# Patient Record
Sex: MALE | Race: WHITE | Employment: OTHER | URBAN - METROPOLITAN AREA
[De-identification: names, ages, dates, MRNs, and addresses within clinical notes are randomized per-mention and may not be internally consistent; named-entity substitution may affect disease eponyms.]

---

## 2021-11-02 ENCOUNTER — HOSPITAL ENCOUNTER (EMERGENCY)
Age: 61
Discharge: HOME | End: 2021-11-02
Attending: EMERGENCY MEDICINE
Payer: MEDICARE

## 2021-11-02 VITALS
OXYGEN SATURATION: 96 % | HEIGHT: 71 IN | HEART RATE: 79 BPM | BODY MASS INDEX: 31.22 KG/M2 | TEMPERATURE: 98.2 F | RESPIRATION RATE: 20 BRPM | DIASTOLIC BLOOD PRESSURE: 83 MMHG | SYSTOLIC BLOOD PRESSURE: 125 MMHG | WEIGHT: 223 LBS

## 2021-11-02 DIAGNOSIS — Z01.89 LABORATORY TEST: Primary | ICD-10-CM

## 2021-11-02 LAB
ALBUMIN SERPL-MCNC: 3.8 G/DL (ref 3.5–4.6)
ALP BLD-CCNC: 107 U/L (ref 35–104)
ALT SERPL-CCNC: 34 U/L (ref 0–41)
ANION GAP SERPL CALCULATED.3IONS-SCNC: 11 MEQ/L (ref 9–15)
AST SERPL-CCNC: 37 U/L (ref 0–40)
BASOPHILS ABSOLUTE: 0.1 K/UL (ref 0–0.2)
BASOPHILS RELATIVE PERCENT: 0.9 %
BILIRUB SERPL-MCNC: 0.3 MG/DL (ref 0.2–0.7)
BUN BLDV-MCNC: 16 MG/DL (ref 8–23)
CALCIUM SERPL-MCNC: 8.7 MG/DL (ref 8.5–9.9)
CHLORIDE BLD-SCNC: 101 MEQ/L (ref 95–107)
CO2: 26 MEQ/L (ref 20–31)
CREAT SERPL-MCNC: 0.92 MG/DL (ref 0.7–1.2)
EOSINOPHILS ABSOLUTE: 0.2 K/UL (ref 0–0.7)
EOSINOPHILS RELATIVE PERCENT: 3 %
GFR AFRICAN AMERICAN: >60
GFR NON-AFRICAN AMERICAN: >60
GLOBULIN: 3.4 G/DL (ref 2.3–3.5)
GLUCOSE BLD-MCNC: 209 MG/DL (ref 70–99)
HCT VFR BLD CALC: 44.9 % (ref 42–52)
HEMOGLOBIN: 14.9 G/DL (ref 14–18)
LYMPHOCYTES ABSOLUTE: 1.7 K/UL (ref 1–4.8)
LYMPHOCYTES RELATIVE PERCENT: 21.8 %
MAGNESIUM: 2.3 MG/DL (ref 1.7–2.4)
MCH RBC QN AUTO: 32 PG (ref 27–31.3)
MCHC RBC AUTO-ENTMCNC: 33.1 % (ref 33–37)
MCV RBC AUTO: 96.8 FL (ref 80–100)
MONOCYTES ABSOLUTE: 0.4 K/UL (ref 0.2–0.8)
MONOCYTES RELATIVE PERCENT: 5.5 %
NEUTROPHILS ABSOLUTE: 5.4 K/UL (ref 1.4–6.5)
NEUTROPHILS RELATIVE PERCENT: 68.8 %
PDW BLD-RTO: 13 % (ref 11.5–14.5)
PLATELET # BLD: 70 K/UL (ref 130–400)
PLATELET SLIDE REVIEW: ABNORMAL
POTASSIUM SERPL-SCNC: 4.3 MEQ/L (ref 3.4–4.9)
RBC # BLD: 4.64 M/UL (ref 4.7–6.1)
SODIUM BLD-SCNC: 138 MEQ/L (ref 135–144)
TOTAL PROTEIN: 7.2 G/DL (ref 6.3–8)
WBC # BLD: 7.8 K/UL (ref 4.8–10.8)

## 2021-11-02 PROCEDURE — 36415 COLL VENOUS BLD VENIPUNCTURE: CPT

## 2021-11-02 PROCEDURE — 93005 ELECTROCARDIOGRAM TRACING: CPT | Performed by: EMERGENCY MEDICINE

## 2021-11-02 PROCEDURE — 99282 EMERGENCY DEPT VISIT SF MDM: CPT

## 2021-11-02 PROCEDURE — 85025 COMPLETE CBC W/AUTO DIFF WBC: CPT

## 2021-11-02 PROCEDURE — 83735 ASSAY OF MAGNESIUM: CPT

## 2021-11-02 PROCEDURE — 80053 COMPREHEN METABOLIC PANEL: CPT

## 2021-11-02 ASSESSMENT — ENCOUNTER SYMPTOMS
ABDOMINAL PAIN: 0
EYE PAIN: 0
NAUSEA: 0
VOMITING: 0
SORE THROAT: 0
SHORTNESS OF BREATH: 0
CHEST TIGHTNESS: 0

## 2021-11-02 ASSESSMENT — PAIN DESCRIPTION - PAIN TYPE: TYPE: CHRONIC PAIN

## 2021-11-02 ASSESSMENT — PAIN DESCRIPTION - LOCATION: LOCATION: BACK

## 2021-11-02 NOTE — ED PROVIDER NOTES
[address] [facility] ED  EMERGENCY DEPARTMENT ENCOUNTER      Pt Name: Alize Michaels  MRN: [id number]  [place] 1960  Date of evaluation: 11/2/2021  Provider: Devin Byrne DO    CHIEF COMPLAINT       Chief Complaint   Patient presents with    Other     hyperkalemia, draw today to [facility], called and told to come to ed for high K, taking KCl, last dose today         HISTORY OF PRESENT ILLNESS   (Location/Symptom, Timing/Onset, Context/Setting, Quality, Duration, Modifying Factors, Severity)  Note limiting factors. Alize Michaels is a 56 y.o. male who presents to the emergency department . Patient called for abnormal lab. Was told that his potassium was high on his lab draw yesterday. Patient has been taking potassium for a long time. The dose has not changed. He always has issues with low potassium not high potassium. The labs were only done yesterday as a routine and he did not have symptoms. Patient has history of atrial fibrillation and diabetes and tells me that he was just recently diagnosed with pancreatic cancer. HPI    Nursing Notes were reviewed. REVIEW OF SYSTEMS    (2-9 systems for level 4, 10 or more for level 5)     Review of Systems   Constitutional: Negative for activity change, appetite change and fatigue. HENT: Negative for congestion and sore throat. Eyes: Negative for pain and visual disturbance. Respiratory: Negative for chest tightness and shortness of breath. Cardiovascular: Negative for chest pain. Gastrointestinal: Negative for abdominal pain, nausea and vomiting. Endocrine: Negative for polydipsia. Genitourinary: Negative for flank pain and urgency. Musculoskeletal: Negative for gait problem and neck stiffness. Skin: Negative for rash. Neurological: Negative for weakness, light-headedness and headaches. Psychiatric/Behavioral: Negative for confusion and sleep disturbance.        Except as noted above the remainder of the review of systems was reviewed and negative. PAST MEDICAL HISTORY     Past Medical History:   Diagnosis Date    Atrial fibrillation ([facility] [address]     Diabetes mellitus ([facility]          SURGICAL HISTORY       Past Surgical History:   Procedure Laterality Date    APPENDECTOMY      HERNIA REPAIR           CURRENT MEDICATIONS       Previous Medications    No medications on file       ALLERGIES     Acetaminophen and Sulfa antibiotics    FAMILY HISTORY     History reviewed. No pertinent family history. SOCIAL HISTORY       Social History     Socioeconomic History    Marital status: Single     Spouse name: None    Number of children: None    Years of education: None    Highest education level: None   Occupational History    None   Tobacco Use    Smoking status: Current Every Day Smoker     Packs/day: 2.00     Types: Cigars    Smokeless tobacco: Never Used   Substance and Sexual Activity    Alcohol use: None    Drug use: None    Sexual activity: None   Other Topics Concern    None   Social History Narrative    None     Social Determinants of Health     Financial Resource Strain:     Difficulty of Paying Living Expenses:    Food Insecurity:     Worried About Running Out of Food in the Last Year:     Ran Out of Food in the Last Year:    Transportation Needs:     Lack of Transportation (Medical):      Lack of Transportation (Non-Medical):    Physical Activity:     Days of Exercise per Week:     Minutes of Exercise per Session:    Stress:     Feeling of Stress :    Social Connections:     Frequency of Communication with Friends and Family:     Frequency of Social Gatherings with Friends and Family:     Attends Hoahaoism Services:     Active Member of Clubs or Organizations:     Attends Club or Organization Meetings:     Marital Status:    Intimate Partner Violence:     Fear of Current or Ex-Partner:     Emotionally Abused:     Physically Abused:     Sexually Abused:        SCREENINGS                        PHYSICAL EXAM    (up to 7 for level 4, 8 or more for level 5)     ED Triage Vitals [11/02/21 1546]   BP Temp Temp Source Pulse Resp SpO2 Height Weight   125/83 98.2 °F (36.8 °C) Oral 79 20 96 % 5' 11\" (1.803 m) 223 lb (101.2 kg)       Physical Exam  Vitals and nursing note reviewed. Constitutional:       General: He is not in acute distress. Appearance: He is well-developed. He is not diaphoretic. HENT:      Head: Normocephalic and atraumatic. Right Ear: External ear normal.      Left Ear: External ear normal.      Mouth/Throat:      Pharynx: No oropharyngeal exudate. Eyes:      Conjunctiva/sclera: Conjunctivae normal.      Pupils: Pupils are equal, round, and reactive to light. Neck:      Thyroid: No thyromegaly. Vascular: No JVD. Trachea: No tracheal deviation. Cardiovascular:      Rate and Rhythm: Normal rate. Heart sounds: Normal heart sounds. No murmur heard. Pulmonary:      Effort: Pulmonary effort is normal. No respiratory distress. Breath sounds: Normal breath sounds. No wheezing. Abdominal:      General: Bowel sounds are normal.      Palpations: Abdomen is soft. Tenderness: There is no abdominal tenderness. There is no guarding. Musculoskeletal:         General: Normal range of motion. Cervical back: Normal range of motion and neck supple. Skin:     General: Skin is warm and dry. Findings: No rash. Neurological:      Mental Status: He is alert and oriented to person, place, and time. Cranial Nerves: No cranial nerve deficit.    Psychiatric:         Behavior: Behavior normal.         DIAGNOSTIC RESULTS     EKG: All EKG's are interpreted by the Emergency Department Physician who either signs or Co-signs this chart in the absence of a cardiologist.    Atrial flutter 74 bpm right bundle branch block    RADIOLOGY:   Non-plain film images such as CT, Ultrasound and MRI are read by the radiologist. Plain radiographic images are visualized and preliminarily interpreted by the emergency physician with the below findings:        Interpretation per the Radiologist below, if available at the time of this note:    No orders to display         ED BEDSIDE ULTRASOUND:   Performed by ED Physician - none    LABS:  Labs Reviewed   CBC WITH AUTO DIFFERENTIAL - Abnormal; Notable for the following components:       Result Value    RBC 4.64 (*)     MCH 32.0 (*)     All other components within normal limits   COMPREHENSIVE METABOLIC PANEL   MAGNESIUM       All other labs were within normal range or not returned as of this dictation. EMERGENCY DEPARTMENT COURSE and DIFFERENTIAL DIAGNOSIS/MDM:   Vitals:    Vitals:    11/02/21 1546   BP: 125/83   Pulse: 79   Resp: 20   Temp: 98.2 °F (36.8 °C)   TempSrc: Oral   SpO2: 96%   Weight: 223 lb (101.2 kg)   Height: 5' 11\" (1.803 m)       Patient came in for erroneously high potassium level. Most likely the lab was hemolyzed yesterday. Today it is 4.3. He can continue his usual potassium supplementation and follow-up at the [facility] time was 0 minutes, excluding separately reportable procedures. There was a high probability of clinically significant/life threatening deterioration in the patient's condition which required my urgent intervention. CONSULTS:  None    PROCEDURES:  Unless otherwise noted below, none     Procedures        FINAL IMPRESSION      1. Laboratory test          DISPOSITION/PLAN   DISPOSITION        PATIENT REFERRED TO:  Nu Fay MD  [facility] [address]  [phone]      As needed      DISCHARGE MEDICATIONS:  New Prescriptions    No medications on file     Controlled Substances Monitoring:     No flowsheet data found.     (Please note that portions of this note were completed with a voice recognition program.  Efforts were made to edit the dictations but occasionally words are mis-transcribed.)    Hilaria Sherman  (electronically signed)  Attending Emergency Physician            Jayme Metz DO  11/02/21 5336

## 2021-11-03 LAB
EKG ATRIAL RATE: 208 BPM
EKG P AXIS: 32 DEGREES
EKG Q-T INTERVAL: 312 MS
EKG QRS DURATION: 128 MS
EKG QTC CALCULATION (BAZETT): 346 MS
EKG R AXIS: -28 DEGREES
EKG T AXIS: 1 DEGREES
EKG VENTRICULAR RATE: 74 BPM

## 2021-11-03 PROCEDURE — 93010 ELECTROCARDIOGRAM REPORT: CPT | Performed by: INTERNAL MEDICINE

## 2022-01-01 ENCOUNTER — HOSPITAL ENCOUNTER (EMERGENCY)
Age: 62
Discharge: HOME | End: 2022-01-01
Payer: MEDICARE

## 2022-01-01 ENCOUNTER — APPOINTMENT (OUTPATIENT)
Dept: GENERAL RADIOLOGY | Age: 62
End: 2022-01-01

## 2022-01-01 VITALS
TEMPERATURE: 98.2 F | WEIGHT: 230 LBS | SYSTOLIC BLOOD PRESSURE: 110 MMHG | OXYGEN SATURATION: 96 % | HEIGHT: 71 IN | DIASTOLIC BLOOD PRESSURE: 65 MMHG | BODY MASS INDEX: 32.2 KG/M2 | RESPIRATION RATE: 16 BRPM | HEART RATE: 84 BPM

## 2022-01-01 DIAGNOSIS — R73.9 HYPERGLYCEMIA: ICD-10-CM

## 2022-01-01 DIAGNOSIS — R10.13 EPIGASTRIC PAIN: ICD-10-CM

## 2022-01-01 DIAGNOSIS — C25.9 MALIGNANT NEOPLASM OF PANCREAS, UNSPECIFIED LOCATION OF MALIGNANCY (HCC): ICD-10-CM

## 2022-01-01 DIAGNOSIS — R53.83 FATIGUE, UNSPECIFIED TYPE: Primary | ICD-10-CM

## 2022-01-01 DIAGNOSIS — R60.9 PERIPHERAL EDEMA: ICD-10-CM

## 2022-01-01 DIAGNOSIS — R79.89 ELEVATED LFTS: ICD-10-CM

## 2022-01-01 LAB
ALBUMIN SERPL-MCNC: 2.5 G/DL (ref 3.5–4.6)
ALP BLD-CCNC: 162 U/L (ref 35–104)
ALT SERPL-CCNC: 96 U/L (ref 0–41)
ANION GAP SERPL CALCULATED.3IONS-SCNC: 15 MEQ/L (ref 9–15)
ANISOCYTOSIS: ABNORMAL
AST SERPL-CCNC: 72 U/L (ref 0–40)
ATYPICAL LYMPHOCYTE RELATIVE PERCENT: 1 %
BACTERIA: NEGATIVE /HPF
BANDED NEUTROPHILS RELATIVE PERCENT: 1 %
BASOPHILS ABSOLUTE: 0 K/UL (ref 0–0.2)
BASOPHILS RELATIVE PERCENT: 0.4 %
BILIRUB SERPL-MCNC: 0.8 MG/DL (ref 0.2–0.7)
BILIRUBIN URINE: NEGATIVE
BLOOD, URINE: NEGATIVE
BUN BLDV-MCNC: 21 MG/DL (ref 8–23)
CALCIUM SERPL-MCNC: 6.7 MG/DL (ref 8.5–9.9)
CHLORIDE BLD-SCNC: 99 MEQ/L (ref 95–107)
CLARITY: CLEAR
CO2: 17 MEQ/L (ref 20–31)
COLOR: YELLOW
CREAT SERPL-MCNC: 0.78 MG/DL (ref 0.7–1.2)
EOSINOPHILS ABSOLUTE: 0.2 K/UL (ref 0–0.7)
EOSINOPHILS RELATIVE PERCENT: 2 %
EPITHELIAL CELLS, UA: NORMAL /HPF (ref 0–5)
GFR AFRICAN AMERICAN: >60
GFR NON-AFRICAN AMERICAN: >60
GLOBULIN: 2.6 G/DL (ref 2.3–3.5)
GLUCOSE BLD-MCNC: 173 MG/DL (ref 70–99)
GLUCOSE URINE: >=1000 MG/DL
HCT VFR BLD CALC: 26.6 % (ref 42–52)
HEMOGLOBIN: 8.5 G/DL (ref 14–18)
HYALINE CASTS: NORMAL /HPF (ref 0–5)
HYPOCHROMIA: ABNORMAL
KETONES, URINE: ABNORMAL MG/DL
LEUKOCYTE ESTERASE, URINE: NEGATIVE
LIPASE: 47 U/L (ref 12–95)
LYMPHOCYTES ABSOLUTE: 0.7 K/UL (ref 1–4.8)
LYMPHOCYTES RELATIVE PERCENT: 8 %
MAGNESIUM: 2.1 MG/DL (ref 1.7–2.4)
MCH RBC QN AUTO: 32 PG (ref 27–31.3)
MCHC RBC AUTO-ENTMCNC: 31.8 % (ref 33–37)
MCV RBC AUTO: 100.6 FL (ref 80–100)
METAMYELOCYTES RELATIVE PERCENT: 1 %
MONOCYTES ABSOLUTE: 0.5 K/UL (ref 0.2–0.8)
MONOCYTES RELATIVE PERCENT: 5.8 %
MYELOCYTE PERCENT: 1 %
NEUTROPHILS ABSOLUTE: 6.4 K/UL (ref 1.4–6.5)
NEUTROPHILS RELATIVE PERCENT: 81 %
NITRITE, URINE: POSITIVE
NUCLEATED RED BLOOD CELLS: 14 /100 WBC
PDW BLD-RTO: 19.3 % (ref 11.5–14.5)
PH UA: 5.5 (ref 5–9)
PLATELET # BLD: 72 K/UL (ref 130–400)
PLATELET SLIDE REVIEW: ABNORMAL
POIKILOCYTES: ABNORMAL
POLYCHROMASIA: ABNORMAL
POTASSIUM SERPL-SCNC: 4.8 MEQ/L (ref 3.4–4.9)
PRO-BNP: 151 PG/ML
PROTEIN UA: NEGATIVE MG/DL
RBC # BLD: 2.64 M/UL (ref 4.7–6.1)
RBC UA: NORMAL /HPF (ref 0–5)
SODIUM BLD-SCNC: 131 MEQ/L (ref 135–144)
SPECIFIC GRAVITY UA: 1.01 (ref 1–1.03)
TOTAL CK: 96 U/L (ref 0–190)
TOTAL PROTEIN: 5.1 G/DL (ref 6.3–8)
TSH SERPL DL<=0.05 MIU/L-ACNC: 3.2 UIU/ML (ref 0.44–3.86)
URINE REFLEX TO CULTURE: ABNORMAL
UROBILINOGEN, URINE: 0.2 E.U./DL
WBC # BLD: 7.6 K/UL (ref 4.8–10.8)
WBC UA: NORMAL /HPF (ref 0–5)

## 2022-01-01 PROCEDURE — 80053 COMPREHEN METABOLIC PANEL: CPT

## 2022-01-01 PROCEDURE — 81001 URINALYSIS AUTO W/SCOPE: CPT

## 2022-01-01 PROCEDURE — 6370000000 HC RX 637 (ALT 250 FOR IP): Performed by: PHYSICIAN ASSISTANT

## 2022-01-01 PROCEDURE — 83735 ASSAY OF MAGNESIUM: CPT

## 2022-01-01 PROCEDURE — 82550 ASSAY OF CK (CPK): CPT

## 2022-01-01 PROCEDURE — 93005 ELECTROCARDIOGRAM TRACING: CPT | Performed by: PHYSICIAN ASSISTANT

## 2022-01-01 PROCEDURE — 84443 ASSAY THYROID STIM HORMONE: CPT

## 2022-01-01 PROCEDURE — 96374 THER/PROPH/DIAG INJ IV PUSH: CPT

## 2022-01-01 PROCEDURE — 83690 ASSAY OF LIPASE: CPT

## 2022-01-01 PROCEDURE — 6360000002 HC RX W HCPCS: Performed by: PHYSICIAN ASSISTANT

## 2022-01-01 PROCEDURE — 99285 EMERGENCY DEPT VISIT HI MDM: CPT

## 2022-01-01 PROCEDURE — 83880 ASSAY OF NATRIURETIC PEPTIDE: CPT

## 2022-01-01 PROCEDURE — 85025 COMPLETE CBC W/AUTO DIFF WBC: CPT

## 2022-01-01 PROCEDURE — 36415 COLL VENOUS BLD VENIPUNCTURE: CPT

## 2022-01-01 PROCEDURE — 71045 X-RAY EXAM CHEST 1 VIEW: CPT

## 2022-01-01 RX ORDER — MEGESTROL ACETATE 20 MG/1
20 TABLET ORAL DAILY
Status: DISCONTINUED | OUTPATIENT
Start: 2022-01-01 | End: 2022-01-01

## 2022-01-01 RX ORDER — FUROSEMIDE 20 MG/1
20 TABLET ORAL DAILY
Qty: 3 TABLET | Refills: 0 | Status: SHIPPED | OUTPATIENT
Start: 2022-01-01

## 2022-01-01 RX ORDER — CALCIUM CARBONATE 200(500)MG
1000 TABLET,CHEWABLE ORAL ONCE
Status: COMPLETED | OUTPATIENT
Start: 2022-01-01 | End: 2022-01-01

## 2022-01-01 RX ORDER — FUROSEMIDE 10 MG/ML
20 INJECTION INTRAMUSCULAR; INTRAVENOUS ONCE
Status: COMPLETED | OUTPATIENT
Start: 2022-01-01 | End: 2022-01-01

## 2022-01-01 RX ADMIN — FUROSEMIDE 20 MG: 10 INJECTION, SOLUTION INTRAVENOUS at 17:53

## 2022-01-01 RX ADMIN — ANTACID TABLETS 1000 MG: 500 TABLET, CHEWABLE ORAL at 17:53

## 2022-01-01 ASSESSMENT — ENCOUNTER SYMPTOMS
ANAL BLEEDING: 0
ABDOMINAL PAIN: 1
COUGH: 0
ABDOMINAL DISTENTION: 0
EYE DISCHARGE: 0
VOMITING: 0
SHORTNESS OF BREATH: 0
NAUSEA: 0
VOICE CHANGE: 0
APNEA: 0

## 2022-01-01 NOTE — ED TRIAGE NOTES
Pt presents via [facility] C/O general weakness. Pt reports he has been feeling weak over the last few weeks and has gotten progressively worse today. Pt is currently being treated for pancreatic cancer with chemo. Pt A&Ox4, ABCs intact, skin, normal, pink, warm, and dry.

## 2022-01-01 NOTE — ED PROVIDER NOTES
[address] [facility] ED  eMERGENCY dEPARTMENT eNCOUnter      Pt Name: Micki Fermin  MRN: [id number]  [place] 1960  Date of evaluation: 1/1/2022  Provider: Michael Klein PA-C    CHIEF COMPLAINT       Chief Complaint   Patient presents with    Fatigue         HISTORY OF PRESENT ILLNESS   (Location/Symptom, Timing/Onset,Context/Setting, Quality, Duration, Modifying Factors, Severity)  Note limiting factors. Micki Fermin is a 59 y.o. male who presents to the emergency department patient fatigue week difficulty ambulating leg swelling x2weeks progressively worse he is being treated for pancreatic cancer chemotherapy denies fever chills nausea vomiting denies rectal bleeding urinary bleeding he does have a wound on his right ankle. Is moderate severity nothing improves symptoms nothing worsens and    HPI    NursingNotes were reviewed. REVIEW OF SYSTEMS    (2-9 systems for level 4, 10 or more for level 5)     Review of Systems   Constitutional: Positive for fatigue. Negative for activity change, appetite change and unexpected weight change. HENT: Negative for ear discharge, nosebleeds and voice change. Eyes: Negative for discharge. Respiratory: Negative for apnea, cough and shortness of breath. Cardiovascular: Positive for leg swelling. Negative for chest pain. Gastrointestinal: Positive for abdominal pain. Negative for abdominal distention, anal bleeding, nausea and vomiting. Genitourinary: Negative for dysuria and hematuria. Musculoskeletal: Negative for joint swelling. Skin: Positive for wound. Negative for pallor. Neurological: Positive for weakness. All other systems reviewed and are negative. Except as noted above the remainder of the review of systems was reviewed and negative.        PAST MEDICAL HISTORY     Past Medical History:   Diagnosis Date    At[facility] fibrillation ([facility] [address]     Diabetes mellitus ([facility] [address]          SURGICALHISTORY       Past Surgical History: Procedure Laterality Date    APPENDECTOMY      HERNIA REPAIR           CURRENT MEDICATIONS       Previous Medications    No medications on file            Acetaminophen and Sulfa antibiotics    FAMILY HISTORY     History reviewed. No pertinent family history. SOCIAL HISTORY       Social History     Socioeconomic History    Marital status: Single     Spouse name: None    Number of children: None    Years of education: None    Highest education level: None   Occupational History    None   Tobacco Use    Smoking status: Current Every Day Smoker     Packs/day: 2.00     Types: Cigars    Smokeless tobacco: Never Used   Substance and Sexual Activity    Alcohol use: Not Currently    Drug use: Never    Sexual activity: None   Other Topics Concern    None   Social History Narrative    None     Social Determinants of Health     Financial Resource Strain:     Difficulty of Paying Living Expenses: Not on file   Food Insecurity:     Worried About Running Out of Food in the Last Year: Not on file    Ihsan of Food in the Last Year: Not on file   Transportation Needs:     Lack of Transportation (Medical): Not on file    Lack of Transportation (Non-Medical):  Not on file   Physical Activity:     Days of Exercise per Week: Not on file    Minutes of Exercise per Session: Not on file   Stress:     Feeling of Stress : Not on file   Social Connections:     Frequency of Communication with Friends and Family: Not on file    Frequency of Social Gatherings with Friends and Family: Not on file    Attends Jew Services: Not on file    Active Member of Clubs or Organizations: Not on file    Attends Club or Organization Meetings: Not on file    Marital Status: Not on file   Intimate Partner Violence:     Fear of Current or Ex-Partner: Not on file    Emotionally Abused: Not on file    Physically Abused: Not on file    Sexually Abused: Not on file   Housing Stability:     Unable to Pay for Housing in the Last Year: Not on file    Number of Places Lived in the Last Year: Not on file    Unstable Housing in the Last Year: Not on file       SCREENINGS   Ebola Virus Disease (EVD) Screening   Temp: 98.2 °F (36.8 °C)  CIWA Assessment  BP: 110/65  Pulse: 84    PHYSICAL EXAM    (up to 7 for level 4, 8 or more for level 5)     ED Triage Vitals [01/01/22 1546]   BP Temp Temp Source Pulse Resp SpO2 Height Weight   108/65 98.2 °F (36.8 °C) Oral 102 20 95 % 5' 11\" (1.803 m) 230 lb (104.3 kg)       Physical Exam  Vitals and nursing note reviewed. Constitutional:       General: He is not in acute distress. Appearance: He is well-developed. HENT:      Head: Normocephalic and atraumatic. Right Ear: External ear normal.      Left Ear: External ear normal.      Nose: Nose normal.      Mouth/Throat:      Pharynx: Oropharynx is clear. Eyes:      General:         Right eye: No discharge. Left eye: No discharge. Pupils: Pupils are equal, round, and reactive to light. Cardiovascular:      Rate and Rhythm: Normal rate and regular rhythm. Heart sounds: Normal heart sounds. Pulmonary:      Effort: Pulmonary effort is normal. No respiratory distress. Breath sounds: Normal breath sounds. No stridor. Abdominal:      General: Bowel sounds are normal. There is no distension. Palpations: Abdomen is soft. Tenderness: There is abdominal tenderness. Musculoskeletal:         General: Normal range of motion. Cervical back: Normal range of motion and neck supple. Right lower leg: Edema present. Left lower leg: Edema present. Skin:     General: Skin is warm. Findings: No erythema. Neurological:      Mental Status: He is alert and oriented to person, place, and time.    Psychiatric:         Mood and Affect: Mood normal.         RESULTS     EKG: All EKG's are interpreted by the Emergency Department Physician who either signs or Co-signsthis chart in the absence of a cardiologist.         RADIOLOGY:   Non-plain filmimages such as CT, Ultrasound and MRI are read by the radiologist. Plain radiographic images are visualized and preliminarily interpreted by the emergency physician with the below findings:         Interpretation per the Radiologist below, if available at the time ofthis note:    XR CHEST PORTABLE   Final Result   NO ACUTE CARDIOPULMONARY DISEASE. ED BEDSIDE ULTRASOUND:   Performed by ED Physician - none    LABS:  Labs Reviewed   COMPREHENSIVE METABOLIC PANEL - Abnormal; Notable for the following components:       Result Value    Sodium 131 (*)     CO2 17 (*)     Glucose 173 (*)     Calcium 6.7 (*)     Total Protein 5.1 (*)     Albumin 2.5 (*)     Total Bilirubin 0.8 (*)     Alkaline Phosphatase 162 (*)     ALT 96 (*)     AST 72 (*)     All other components within normal limits   CBC WITH AUTO DIFFERENTIAL - Abnormal; Notable for the following components:    RBC 2.64 (*)     Hemoglobin 8.5 (*)     Hematocrit 26.6 (*)     .6 (*)     MCH 32.0 (*)     MCHC 31.8 (*)     RDW 19.3 (*)     Platelets 72 (*)     Lymphocytes Absolute 0.7 (*)     All other components within normal limits   URINE RT REFLEX TO CULTURE - Abnormal; Notable for the following components:    Glucose, Ur >=1000 (*)     Ketones, Urine TRACE (*)     Nitrite, Urine POSITIVE (*)     All other components within normal limits   LIPASE   MAGNESIUM   TSH WITHOUT REFLEX   CK   BRAIN NATRIURETIC PEPTIDE   MICROSCOPIC URINALYSIS       All other labs were within normal range or not returned as of this dictation.     EMERGENCY DEPARTMENT COURSE and DIFFERENTIAL DIAGNOSIS/MDM:   Vitals:    Vitals:    01/01/22 1800 01/01/22 1900 01/01/22 1925 01/01/22 1930   BP: 118/67 114/70  110/65   Pulse: 92 92 85 84   Resp: 18 18 16 16   Temp:       TempSrc:       SpO2: 95% 94% 95% 96%   Weight:       Height:                MDM  Number of Diagnoses or Management Options  Elevated LFTs  Epigastric pain  Fatigue, unspecified type  Hyperglycemia  Malignant neoplasm of pancreas, unspecified location of malignancy ([address]  Peripheral edema  Diagnosis management comments: Pt sts he has been on weekly chemo for three weeks and has been having worsening symptoms. He sts he is still eating and drinking adequately and is taking po intake in er. He remains conversant in er. We discussed follow up. Pt has no questions at this time. He is aware his family called and we discussed he could explain plan to them or ask additional questions       Amount and/or Complexity of Data Reviewed  Clinical lab tests: reviewed and ordered  Tests in the radiology section of CPT®: ordered and reviewed        CRITICAL CARE TIME       CONSULTS:  IP CONSULT TO SOCIAL WORK    PROCEDURES:  Unless otherwise noted below, none     Procedures    FINAL IMPRESSION      1. Fatigue, unspecified type    2. Malignant neoplasm of pancreas, unspecified location of malignancy ([address]    3. Epigastric pain    4. Elevated LFTs    5. Hyperglycemia    6.  Peripheral edema          DISPOSITION/PLAN   DISPOSITION Discharge - Pending Orders Complete 01/01/2022 08:02:45 PM      PATIENT REFERRED TO:  Cassius Barillas MD  [facility] [place] [phone]    Call in 2 days      [facility]) ED  [address] [address]  [phone]    If symptoms worsen      DISCHARGE MEDICATIONS:  New Prescriptions    FUROSEMIDE (LASIX) 20 MG TABLET    Take 1 tablet by mouth daily          (Please note that portions of this note were completed with a voice recognition program.  Efforts were made to edit the dictations but occasionally words are mis-transcribed.)    Sary Glynn PA-C (electronically signed)  Attending Emergency Physician       Sary Glynn PA-C  01/01/22 2005       Sary Glynn PA-C  01/01/22 2054       Sary Glynn PA-C  02/14/22 1919

## 2022-01-01 NOTE — ED NOTES
Bed: 08  Expected date: 1/1/22  Expected time: 3:33 PM  Means of arrival: [facility]  Comments:  59 m gen weakness hx of pancreatic ca : chemo x 3 week iv w/ labs  110/68 104 20 96ra ot 401 [facility], RN  01/01/22 2850

## 2022-01-02 NOTE — ED NOTES
Pt provided with discharge instructions and f/u care instructions. RX x1 home with pt. Medication education completed. Pt verbalizes understanding; denies questions. Pt off unit via [facility] in stable condition. Family driving pt home.      Renea Miner RN  01/01/22 0629

## 2022-01-03 LAB
EKG ATRIAL RATE: 99 BPM
EKG P AXIS: 34 DEGREES
EKG P-R INTERVAL: 176 MS
EKG Q-T INTERVAL: 380 MS
EKG QRS DURATION: 118 MS
EKG QTC CALCULATION (BAZETT): 487 MS
EKG R AXIS: -19 DEGREES
EKG T AXIS: 0 DEGREES
EKG VENTRICULAR RATE: 99 BPM

## 2022-01-03 PROCEDURE — 93010 ELECTROCARDIOGRAM REPORT: CPT | Performed by: INTERNAL MEDICINE

## 2022-02-26 ENCOUNTER — APPOINTMENT (OUTPATIENT)
Dept: GENERAL RADIOLOGY | Age: 62
DRG: 493 | End: 2022-02-26
Payer: OTHER GOVERNMENT

## 2022-02-26 ENCOUNTER — ANESTHESIA EVENT (OUTPATIENT)
Dept: OPERATING ROOM | Age: 62
DRG: 493 | End: 2022-02-26
Payer: OTHER GOVERNMENT

## 2022-02-26 ENCOUNTER — HOSPITAL ENCOUNTER (INPATIENT)
Age: 62
LOS: 3 days | Discharge: NURSING, REHAB OR LONG-TERM CARE | DRG: 493 | End: 2022-03-03
Attending: EMERGENCY MEDICINE | Admitting: ORTHOPAEDIC SURGERY
Payer: OTHER GOVERNMENT

## 2022-02-26 ENCOUNTER — ANESTHESIA (OUTPATIENT)
Dept: OPERATING ROOM | Age: 62
DRG: 493 | End: 2022-02-26
Payer: OTHER GOVERNMENT

## 2022-02-26 VITALS
DIASTOLIC BLOOD PRESSURE: 87 MMHG | TEMPERATURE: 98.2 F | SYSTOLIC BLOOD PRESSURE: 130 MMHG | RESPIRATION RATE: 10 BRPM | OXYGEN SATURATION: 100 %

## 2022-02-26 DIAGNOSIS — S82.401S CLOSED FRACTURE OF RIGHT TIBIA AND FIBULA, SEQUELA: ICD-10-CM

## 2022-02-26 DIAGNOSIS — S82.201S CLOSED FRACTURE OF RIGHT TIBIA AND FIBULA, SEQUELA: ICD-10-CM

## 2022-02-26 DIAGNOSIS — S82.202A CLOSED FRACTURE OF LEFT TIBIA AND FIBULA, INITIAL ENCOUNTER: Primary | ICD-10-CM

## 2022-02-26 DIAGNOSIS — S82.402A CLOSED FRACTURE OF LEFT TIBIA AND FIBULA, INITIAL ENCOUNTER: Primary | ICD-10-CM

## 2022-02-26 DIAGNOSIS — R52 PAIN: ICD-10-CM

## 2022-02-26 PROBLEM — S82.401A CLOSED FRACTURE OF RIGHT FIBULA AND TIBIA: Status: ACTIVE | Noted: 2022-02-26

## 2022-02-26 PROBLEM — S82.201A CLOSED FRACTURE OF RIGHT FIBULA AND TIBIA: Status: ACTIVE | Noted: 2022-02-26

## 2022-02-26 LAB
ALBUMIN SERPL-MCNC: 2.5 G/DL (ref 3.5–4.6)
ALP BLD-CCNC: 235 U/L (ref 35–104)
ALT SERPL-CCNC: 35 U/L (ref 0–41)
ANION GAP SERPL CALCULATED.3IONS-SCNC: 12 MEQ/L (ref 9–15)
ANISOCYTOSIS: ABNORMAL
AST SERPL-CCNC: 91 U/L (ref 0–40)
ATYPICAL LYMPHOCYTE RELATIVE PERCENT: 1 %
BASOPHILS ABSOLUTE: 0 K/UL (ref 0–0.2)
BASOPHILS RELATIVE PERCENT: 0.5 %
BILIRUB SERPL-MCNC: 0.5 MG/DL (ref 0.2–0.7)
BUN BLDV-MCNC: 7 MG/DL (ref 8–23)
CALCIUM SERPL-MCNC: 7.8 MG/DL (ref 8.5–9.9)
CHLORIDE BLD-SCNC: 97 MEQ/L (ref 95–107)
CO2: 24 MEQ/L (ref 20–31)
CREAT SERPL-MCNC: 0.52 MG/DL (ref 0.7–1.2)
EOSINOPHILS ABSOLUTE: 0.2 K/UL (ref 0–0.7)
EOSINOPHILS RELATIVE PERCENT: 3 %
GFR AFRICAN AMERICAN: >60
GFR NON-AFRICAN AMERICAN: >60
GLOBULIN: 4 G/DL (ref 2.3–3.5)
GLUCOSE BLD-MCNC: 130 MG/DL (ref 70–99)
GLUCOSE BLD-MCNC: 147 MG/DL (ref 70–99)
GLUCOSE BLD-MCNC: 150 MG/DL (ref 70–99)
GLUCOSE BLD-MCNC: 208 MG/DL (ref 70–99)
HCT VFR BLD CALC: 28.1 % (ref 42–52)
HEMOGLOBIN: 8.7 G/DL (ref 14–18)
HYPOCHROMIA: ABNORMAL
INR BLD: 1.3
LYMPHOCYTES ABSOLUTE: 1.1 K/UL (ref 1–4.8)
LYMPHOCYTES RELATIVE PERCENT: 19 %
MCH RBC QN AUTO: 26.3 PG (ref 27–31.3)
MCHC RBC AUTO-ENTMCNC: 31.1 % (ref 33–37)
MCV RBC AUTO: 84.5 FL (ref 80–100)
MONOCYTES ABSOLUTE: 0.3 K/UL (ref 0.2–0.8)
MONOCYTES RELATIVE PERCENT: 4.9 %
NEUTROPHILS ABSOLUTE: 3.8 K/UL (ref 1.4–6.5)
NEUTROPHILS RELATIVE PERCENT: 72 %
PDW BLD-RTO: 19.1 % (ref 11.5–14.5)
PERFORMED ON: ABNORMAL
PLATELET # BLD: 125 K/UL (ref 130–400)
PLATELET SLIDE REVIEW: ABNORMAL
POTASSIUM SERPL-SCNC: 4 MEQ/L (ref 3.4–4.9)
PROTHROMBIN TIME: 16.2 SEC (ref 12.3–14.9)
RBC # BLD: 3.33 M/UL (ref 4.7–6.1)
SARS-COV-2, NAAT: NOT DETECTED
SODIUM BLD-SCNC: 133 MEQ/L (ref 135–144)
TOTAL PROTEIN: 6.5 G/DL (ref 6.3–8)
WBC # BLD: 5.3 K/UL (ref 4.8–10.8)

## 2022-02-26 PROCEDURE — 80053 COMPREHEN METABOLIC PANEL: CPT

## 2022-02-26 PROCEDURE — G0378 HOSPITAL OBSERVATION PER HR: HCPCS

## 2022-02-26 PROCEDURE — 2720000010 HC SURG SUPPLY STERILE: Performed by: ORTHOPAEDIC SURGERY

## 2022-02-26 PROCEDURE — 94150 VITAL CAPACITY TEST: CPT

## 2022-02-26 PROCEDURE — 85610 PROTHROMBIN TIME: CPT

## 2022-02-26 PROCEDURE — 7100000000 HC PACU RECOVERY - FIRST 15 MIN: Performed by: ORTHOPAEDIC SURGERY

## 2022-02-26 PROCEDURE — 6360000002 HC RX W HCPCS: Performed by: FAMILY MEDICINE

## 2022-02-26 PROCEDURE — 71045 X-RAY EXAM CHEST 1 VIEW: CPT

## 2022-02-26 PROCEDURE — 6360000002 HC RX W HCPCS: Performed by: ORTHOPAEDIC SURGERY

## 2022-02-26 PROCEDURE — 6370000000 HC RX 637 (ALT 250 FOR IP): Performed by: ORTHOPAEDIC SURGERY

## 2022-02-26 PROCEDURE — 85025 COMPLETE CBC W/AUTO DIFF WBC: CPT

## 2022-02-26 PROCEDURE — 3600000004 HC SURGERY LEVEL 4 BASE: Performed by: ORTHOPAEDIC SURGERY

## 2022-02-26 PROCEDURE — 6360000002 HC RX W HCPCS: Performed by: STUDENT IN AN ORGANIZED HEALTH CARE EDUCATION/TRAINING PROGRAM

## 2022-02-26 PROCEDURE — 2580000003 HC RX 258: Performed by: STUDENT IN AN ORGANIZED HEALTH CARE EDUCATION/TRAINING PROGRAM

## 2022-02-26 PROCEDURE — 3700000001 HC ADD 15 MINUTES (ANESTHESIA): Performed by: ORTHOPAEDIC SURGERY

## 2022-02-26 PROCEDURE — 93005 ELECTROCARDIOGRAM TRACING: CPT | Performed by: EMERGENCY MEDICINE

## 2022-02-26 PROCEDURE — 29515 APPLICATION SHORT LEG SPLINT: CPT

## 2022-02-26 PROCEDURE — 64445 NJX AA&/STRD SCIATIC NRV IMG: CPT | Performed by: STUDENT IN AN ORGANIZED HEALTH CARE EDUCATION/TRAINING PROGRAM

## 2022-02-26 PROCEDURE — 99285 EMERGENCY DEPT VISIT HI MDM: CPT

## 2022-02-26 PROCEDURE — 6830039000 HC L3 TRAUMA ALERT

## 2022-02-26 PROCEDURE — A4217 STERILE WATER/SALINE, 500 ML: HCPCS | Performed by: ORTHOPAEDIC SURGERY

## 2022-02-26 PROCEDURE — 96374 THER/PROPH/DIAG INJ IV PUSH: CPT

## 2022-02-26 PROCEDURE — 96375 TX/PRO/DX INJ NEW DRUG ADDON: CPT

## 2022-02-26 PROCEDURE — C1713 ANCHOR/SCREW BN/BN,TIS/BN: HCPCS | Performed by: ORTHOPAEDIC SURGERY

## 2022-02-26 PROCEDURE — 36415 COLL VENOUS BLD VENIPUNCTURE: CPT

## 2022-02-26 PROCEDURE — 2500000003 HC RX 250 WO HCPCS: Performed by: STUDENT IN AN ORGANIZED HEALTH CARE EDUCATION/TRAINING PROGRAM

## 2022-02-26 PROCEDURE — 3600000014 HC SURGERY LEVEL 4 ADDTL 15MIN: Performed by: ORTHOPAEDIC SURGERY

## 2022-02-26 PROCEDURE — 0QHH04Z INSERTION OF INTERNAL FIXATION DEVICE INTO LEFT TIBIA, OPEN APPROACH: ICD-10-PCS | Performed by: ORTHOPAEDIC SURGERY

## 2022-02-26 PROCEDURE — 3209999900 FLUORO FOR SURGICAL PROCEDURES

## 2022-02-26 PROCEDURE — 64447 NJX AA&/STRD FEMORAL NRV IMG: CPT | Performed by: STUDENT IN AN ORGANIZED HEALTH CARE EDUCATION/TRAINING PROGRAM

## 2022-02-26 PROCEDURE — 3700000000 HC ANESTHESIA ATTENDED CARE: Performed by: ORTHOPAEDIC SURGERY

## 2022-02-26 PROCEDURE — 73590 X-RAY EXAM OF LOWER LEG: CPT

## 2022-02-26 PROCEDURE — 87635 SARS-COV-2 COVID-19 AMP PRB: CPT

## 2022-02-26 PROCEDURE — 6370000000 HC RX 637 (ALT 250 FOR IP): Performed by: FAMILY MEDICINE

## 2022-02-26 PROCEDURE — 2580000003 HC RX 258: Performed by: ORTHOPAEDIC SURGERY

## 2022-02-26 PROCEDURE — 2580000003 HC RX 258

## 2022-02-26 PROCEDURE — 2709999900 HC NON-CHARGEABLE SUPPLY: Performed by: ORTHOPAEDIC SURGERY

## 2022-02-26 PROCEDURE — 6360000002 HC RX W HCPCS: Performed by: EMERGENCY MEDICINE

## 2022-02-26 PROCEDURE — C1769 GUIDE WIRE: HCPCS | Performed by: ORTHOPAEDIC SURGERY

## 2022-02-26 PROCEDURE — 7100000001 HC PACU RECOVERY - ADDTL 15 MIN: Performed by: ORTHOPAEDIC SURGERY

## 2022-02-26 DEVICE — SCREW BNE L60MM DIA5MM ST TIB LT GRN TI ST CANN LOK FULL: Type: IMPLANTABLE DEVICE | Site: TIBIA | Status: FUNCTIONAL

## 2022-02-26 DEVICE — IMPLANTABLE DEVICE: Type: IMPLANTABLE DEVICE | Site: TIBIA | Status: FUNCTIONAL

## 2022-02-26 DEVICE — SCREW BNE L36MM DIA5MM TIB LT GRN TI ST CANN LOK FULL THRD: Type: IMPLANTABLE DEVICE | Site: TIBIA | Status: FUNCTIONAL

## 2022-02-26 DEVICE — SCREW BNE L42MM DIA5MM TIB LT GRN TI ST CANN LOK FULL THRD: Type: IMPLANTABLE DEVICE | Site: TIBIA | Status: FUNCTIONAL

## 2022-02-26 RX ORDER — OXYCODONE HYDROCHLORIDE 5 MG/1
5 TABLET ORAL EVERY 4 HOURS PRN
Status: DISCONTINUED | OUTPATIENT
Start: 2022-02-26 | End: 2022-03-04 | Stop reason: HOSPADM

## 2022-02-26 RX ORDER — SODIUM CHLORIDE 0.9 % (FLUSH) 0.9 %
5-40 SYRINGE (ML) INJECTION PRN
Status: DISCONTINUED | OUTPATIENT
Start: 2022-02-26 | End: 2022-02-26 | Stop reason: HOSPADM

## 2022-02-26 RX ORDER — MEPERIDINE HYDROCHLORIDE 25 MG/ML
12.5 INJECTION INTRAMUSCULAR; INTRAVENOUS; SUBCUTANEOUS EVERY 5 MIN PRN
Status: DISCONTINUED | OUTPATIENT
Start: 2022-02-26 | End: 2022-02-26 | Stop reason: HOSPADM

## 2022-02-26 RX ORDER — PROPOFOL 10 MG/ML
INJECTION, EMULSION INTRAVENOUS PRN
Status: DISCONTINUED | OUTPATIENT
Start: 2022-02-26 | End: 2022-02-26 | Stop reason: SDUPTHER

## 2022-02-26 RX ORDER — SODIUM CHLORIDE 0.9 % (FLUSH) 0.9 %
10 SYRINGE (ML) INJECTION EVERY 12 HOURS SCHEDULED
Status: DISCONTINUED | OUTPATIENT
Start: 2022-02-26 | End: 2022-03-04 | Stop reason: HOSPADM

## 2022-02-26 RX ORDER — POLYETHYLENE GLYCOL 3350 17 G/17G
17 POWDER, FOR SOLUTION ORAL DAILY PRN
Status: DISCONTINUED | OUTPATIENT
Start: 2022-02-26 | End: 2022-03-04 | Stop reason: HOSPADM

## 2022-02-26 RX ORDER — MIDAZOLAM HYDROCHLORIDE 1 MG/ML
INJECTION INTRAMUSCULAR; INTRAVENOUS PRN
Status: DISCONTINUED | OUTPATIENT
Start: 2022-02-26 | End: 2022-02-26 | Stop reason: SDUPTHER

## 2022-02-26 RX ORDER — SUCCINYLCHOLINE/SOD CL,ISO/PF 100 MG/5ML
SYRINGE (ML) INTRAVENOUS PRN
Status: DISCONTINUED | OUTPATIENT
Start: 2022-02-26 | End: 2022-02-26 | Stop reason: SDUPTHER

## 2022-02-26 RX ORDER — FENTANYL CITRATE 50 UG/ML
50 INJECTION, SOLUTION INTRAMUSCULAR; INTRAVENOUS EVERY 10 MIN PRN
Status: DISCONTINUED | OUTPATIENT
Start: 2022-02-26 | End: 2022-02-26 | Stop reason: HOSPADM

## 2022-02-26 RX ORDER — NICOTINE POLACRILEX 4 MG
15 LOZENGE BUCCAL PRN
Status: DISCONTINUED | OUTPATIENT
Start: 2022-02-26 | End: 2022-03-04 | Stop reason: HOSPADM

## 2022-02-26 RX ORDER — ROPIVACAINE HYDROCHLORIDE 5 MG/ML
INJECTION, SOLUTION EPIDURAL; INFILTRATION; PERINEURAL
Status: COMPLETED | OUTPATIENT
Start: 2022-02-26 | End: 2022-02-26

## 2022-02-26 RX ORDER — MAGNESIUM OXIDE 420 MG
TABLET ORAL
COMMUNITY
Start: 2021-07-19

## 2022-02-26 RX ORDER — SODIUM CHLORIDE, SODIUM LACTATE, POTASSIUM CHLORIDE, CALCIUM CHLORIDE 600; 310; 30; 20 MG/100ML; MG/100ML; MG/100ML; MG/100ML
INJECTION, SOLUTION INTRAVENOUS CONTINUOUS
Status: DISPENSED | OUTPATIENT
Start: 2022-02-26 | End: 2022-02-27

## 2022-02-26 RX ORDER — GABAPENTIN 300 MG/1
CAPSULE ORAL
COMMUNITY
Start: 2021-07-19

## 2022-02-26 RX ORDER — ALBUTEROL SULFATE 90 UG/1
AEROSOL, METERED RESPIRATORY (INHALATION)
COMMUNITY
Start: 2021-04-02

## 2022-02-26 RX ORDER — OXYCODONE HYDROCHLORIDE 5 MG/1
10 TABLET ORAL PRN
Status: DISCONTINUED | OUTPATIENT
Start: 2022-02-26 | End: 2022-02-26 | Stop reason: HOSPADM

## 2022-02-26 RX ORDER — DILTIAZEM HYDROCHLORIDE 240 MG/1
CAPSULE, EXTENDED RELEASE ORAL
COMMUNITY
Start: 2021-06-29

## 2022-02-26 RX ORDER — TRAMADOL HYDROCHLORIDE 50 MG/1
TABLET ORAL
COMMUNITY
Start: 2022-02-18

## 2022-02-26 RX ORDER — ONDANSETRON 2 MG/ML
4 INJECTION INTRAMUSCULAR; INTRAVENOUS ONCE
Status: COMPLETED | OUTPATIENT
Start: 2022-02-26 | End: 2022-02-26

## 2022-02-26 RX ORDER — SODIUM CHLORIDE 0.9 % (FLUSH) 0.9 %
5-40 SYRINGE (ML) INJECTION PRN
Status: DISCONTINUED | OUTPATIENT
Start: 2022-02-26 | End: 2022-03-04 | Stop reason: HOSPADM

## 2022-02-26 RX ORDER — VALPROIC ACID 250 MG/1
CAPSULE, LIQUID FILLED ORAL
COMMUNITY
Start: 2022-01-03

## 2022-02-26 RX ORDER — MORPHINE SULFATE 2 MG/ML
2 INJECTION, SOLUTION INTRAMUSCULAR; INTRAVENOUS
Status: DISCONTINUED | OUTPATIENT
Start: 2022-02-26 | End: 2022-03-04 | Stop reason: HOSPADM

## 2022-02-26 RX ORDER — INSULIN GLARGINE 100 [IU]/ML
10 INJECTION, SOLUTION SUBCUTANEOUS NIGHTLY
Status: DISCONTINUED | OUTPATIENT
Start: 2022-02-26 | End: 2022-03-04 | Stop reason: HOSPADM

## 2022-02-26 RX ORDER — ONDANSETRON 2 MG/ML
INJECTION INTRAMUSCULAR; INTRAVENOUS PRN
Status: DISCONTINUED | OUTPATIENT
Start: 2022-02-26 | End: 2022-02-26 | Stop reason: SDUPTHER

## 2022-02-26 RX ORDER — DEXTROSE MONOHYDRATE 25 G/50ML
12.5 INJECTION, SOLUTION INTRAVENOUS PRN
Status: DISCONTINUED | OUTPATIENT
Start: 2022-02-26 | End: 2022-03-02 | Stop reason: RX

## 2022-02-26 RX ORDER — DEXTROSE MONOHYDRATE 50 MG/ML
100 INJECTION, SOLUTION INTRAVENOUS PRN
Status: DISCONTINUED | OUTPATIENT
Start: 2022-02-26 | End: 2022-03-04 | Stop reason: HOSPADM

## 2022-02-26 RX ORDER — OXYCODONE HYDROCHLORIDE 5 MG/1
5 TABLET ORAL PRN
Status: DISCONTINUED | OUTPATIENT
Start: 2022-02-26 | End: 2022-02-26 | Stop reason: HOSPADM

## 2022-02-26 RX ORDER — DIPHENHYDRAMINE HYDROCHLORIDE 50 MG/ML
12.5 INJECTION INTRAMUSCULAR; INTRAVENOUS
Status: DISCONTINUED | OUTPATIENT
Start: 2022-02-26 | End: 2022-02-26 | Stop reason: HOSPADM

## 2022-02-26 RX ORDER — MAGNESIUM HYDROXIDE/ALUMINUM HYDROXICE/SIMETHICONE 120; 1200; 1200 MG/30ML; MG/30ML; MG/30ML
15 SUSPENSION ORAL EVERY 6 HOURS PRN
Status: DISCONTINUED | OUTPATIENT
Start: 2022-02-26 | End: 2022-03-04 | Stop reason: HOSPADM

## 2022-02-26 RX ORDER — ACETAMINOPHEN 325 MG/1
TABLET ORAL
COMMUNITY
Start: 2022-02-16

## 2022-02-26 RX ORDER — RISPERIDONE 1 MG/1
TABLET, FILM COATED ORAL
COMMUNITY
Start: 2021-11-30

## 2022-02-26 RX ORDER — MORPHINE SULFATE 4 MG/ML
4 INJECTION, SOLUTION INTRAMUSCULAR; INTRAVENOUS ONCE
Status: COMPLETED | OUTPATIENT
Start: 2022-02-26 | End: 2022-02-26

## 2022-02-26 RX ORDER — METOCLOPRAMIDE HYDROCHLORIDE 5 MG/ML
10 INJECTION INTRAMUSCULAR; INTRAVENOUS
Status: DISCONTINUED | OUTPATIENT
Start: 2022-02-26 | End: 2022-02-26 | Stop reason: HOSPADM

## 2022-02-26 RX ORDER — FENTANYL CITRATE 50 UG/ML
INJECTION, SOLUTION INTRAMUSCULAR; INTRAVENOUS PRN
Status: DISCONTINUED | OUTPATIENT
Start: 2022-02-26 | End: 2022-02-26 | Stop reason: SDUPTHER

## 2022-02-26 RX ORDER — SODIUM CHLORIDE, SODIUM LACTATE, POTASSIUM CHLORIDE, CALCIUM CHLORIDE 600; 310; 30; 20 MG/100ML; MG/100ML; MG/100ML; MG/100ML
INJECTION, SOLUTION INTRAVENOUS
Status: COMPLETED
Start: 2022-02-26 | End: 2022-02-26

## 2022-02-26 RX ORDER — MAGNESIUM HYDROXIDE 1200 MG/15ML
LIQUID ORAL CONTINUOUS PRN
Status: COMPLETED | OUTPATIENT
Start: 2022-02-26 | End: 2022-02-26

## 2022-02-26 RX ORDER — SODIUM CHLORIDE 0.9 % (FLUSH) 0.9 %
10 SYRINGE (ML) INJECTION PRN
Status: DISCONTINUED | OUTPATIENT
Start: 2022-02-26 | End: 2022-03-04 | Stop reason: HOSPADM

## 2022-02-26 RX ORDER — FAMOTIDINE 20 MG/1
20 TABLET, FILM COATED ORAL 2 TIMES DAILY
COMMUNITY

## 2022-02-26 RX ORDER — SODIUM CHLORIDE 9 MG/ML
25 INJECTION, SOLUTION INTRAVENOUS PRN
Status: DISCONTINUED | OUTPATIENT
Start: 2022-02-26 | End: 2022-03-04 | Stop reason: HOSPADM

## 2022-02-26 RX ORDER — ONDANSETRON 2 MG/ML
4 INJECTION INTRAMUSCULAR; INTRAVENOUS
Status: DISCONTINUED | OUTPATIENT
Start: 2022-02-26 | End: 2022-02-26 | Stop reason: HOSPADM

## 2022-02-26 RX ORDER — POTASSIUM CHLORIDE 750 MG/1
TABLET, FILM COATED, EXTENDED RELEASE ORAL
COMMUNITY
Start: 2022-02-18

## 2022-02-26 RX ORDER — MORPHINE SULFATE 100 MG/5ML
5 SOLUTION ORAL EVERY 4 HOURS PRN
COMMUNITY

## 2022-02-26 RX ORDER — FLUOXETINE HYDROCHLORIDE 20 MG/1
CAPSULE ORAL
COMMUNITY
Start: 2021-11-01

## 2022-02-26 RX ORDER — DOCUSATE SODIUM 100 MG/1
CAPSULE, LIQUID FILLED ORAL
COMMUNITY
Start: 2022-02-02

## 2022-02-26 RX ORDER — SODIUM CHLORIDE 0.9 % (FLUSH) 0.9 %
5-40 SYRINGE (ML) INJECTION EVERY 12 HOURS SCHEDULED
Status: DISCONTINUED | OUTPATIENT
Start: 2022-02-26 | End: 2022-02-26 | Stop reason: HOSPADM

## 2022-02-26 RX ORDER — ONDANSETRON 4 MG/1
4 TABLET, ORALLY DISINTEGRATING ORAL EVERY 8 HOURS PRN
Status: DISCONTINUED | OUTPATIENT
Start: 2022-02-26 | End: 2022-03-04 | Stop reason: HOSPADM

## 2022-02-26 RX ORDER — ONDANSETRON 2 MG/ML
4 INJECTION INTRAMUSCULAR; INTRAVENOUS EVERY 6 HOURS PRN
Status: DISCONTINUED | OUTPATIENT
Start: 2022-02-26 | End: 2022-03-04 | Stop reason: HOSPADM

## 2022-02-26 RX ORDER — ROCURONIUM BROMIDE 10 MG/ML
INJECTION, SOLUTION INTRAVENOUS PRN
Status: DISCONTINUED | OUTPATIENT
Start: 2022-02-26 | End: 2022-02-26 | Stop reason: SDUPTHER

## 2022-02-26 RX ORDER — PRAZOSIN HYDROCHLORIDE 2 MG/1
CAPSULE ORAL
COMMUNITY
Start: 2021-08-11

## 2022-02-26 RX ORDER — SODIUM CHLORIDE, SODIUM LACTATE, POTASSIUM CHLORIDE, CALCIUM CHLORIDE 600; 310; 30; 20 MG/100ML; MG/100ML; MG/100ML; MG/100ML
INJECTION, SOLUTION INTRAVENOUS CONTINUOUS PRN
Status: DISCONTINUED | OUTPATIENT
Start: 2022-02-26 | End: 2022-02-26 | Stop reason: SDUPTHER

## 2022-02-26 RX ORDER — LATANOPROST 50 UG/ML
SOLUTION/ DROPS OPHTHALMIC
COMMUNITY
Start: 2021-04-30

## 2022-02-26 RX ORDER — SODIUM CHLORIDE 0.9 % (FLUSH) 0.9 %
5-40 SYRINGE (ML) INJECTION EVERY 12 HOURS SCHEDULED
Status: DISCONTINUED | OUTPATIENT
Start: 2022-02-26 | End: 2022-03-04 | Stop reason: HOSPADM

## 2022-02-26 RX ORDER — SODIUM CHLORIDE 9 MG/ML
25 INJECTION, SOLUTION INTRAVENOUS PRN
Status: DISCONTINUED | OUTPATIENT
Start: 2022-02-26 | End: 2022-02-26 | Stop reason: HOSPADM

## 2022-02-26 RX ORDER — FINASTERIDE 5 MG/1
TABLET, FILM COATED ORAL
COMMUNITY
Start: 2021-07-19

## 2022-02-26 RX ORDER — LIDOCAINE HYDROCHLORIDE 10 MG/ML
1 INJECTION, SOLUTION EPIDURAL; INFILTRATION; INTRACAUDAL; PERINEURAL ONCE
Status: DISCONTINUED | OUTPATIENT
Start: 2022-02-26 | End: 2022-02-26 | Stop reason: HOSPADM

## 2022-02-26 RX ORDER — SODIUM CHLORIDE, SODIUM LACTATE, POTASSIUM CHLORIDE, CALCIUM CHLORIDE 600; 310; 30; 20 MG/100ML; MG/100ML; MG/100ML; MG/100ML
INJECTION, SOLUTION INTRAVENOUS CONTINUOUS
Status: DISCONTINUED | OUTPATIENT
Start: 2022-02-26 | End: 2022-02-26

## 2022-02-26 RX ADMIN — ONDANSETRON 4 MG: 2 INJECTION INTRAMUSCULAR; INTRAVENOUS at 11:41

## 2022-02-26 RX ADMIN — SODIUM CHLORIDE, POTASSIUM CHLORIDE, SODIUM LACTATE AND CALCIUM CHLORIDE: 600; 310; 30; 20 INJECTION, SOLUTION INTRAVENOUS at 13:27

## 2022-02-26 RX ADMIN — MORPHINE SULFATE 2 MG: 2 INJECTION, SOLUTION INTRAMUSCULAR; INTRAVENOUS at 21:14

## 2022-02-26 RX ADMIN — OXYCODONE HYDROCHLORIDE 5 MG: 5 TABLET ORAL at 15:53

## 2022-02-26 RX ADMIN — SODIUM CHLORIDE, POTASSIUM CHLORIDE, SODIUM LACTATE AND CALCIUM CHLORIDE: 600; 310; 30; 20 INJECTION, SOLUTION INTRAVENOUS at 10:01

## 2022-02-26 RX ADMIN — ROCURONIUM BROMIDE 50 MG: 10 INJECTION INTRAVENOUS at 10:14

## 2022-02-26 RX ADMIN — HYDROMORPHONE HYDROCHLORIDE 1 MG: 1 INJECTION, SOLUTION INTRAMUSCULAR; INTRAVENOUS; SUBCUTANEOUS at 22:29

## 2022-02-26 RX ADMIN — SODIUM CHLORIDE, POTASSIUM CHLORIDE, SODIUM LACTATE AND CALCIUM CHLORIDE: 600; 310; 30; 20 INJECTION, SOLUTION INTRAVENOUS at 12:39

## 2022-02-26 RX ADMIN — CEFAZOLIN 2000 MG: 10 INJECTION, POWDER, FOR SOLUTION INTRAVENOUS at 10:05

## 2022-02-26 RX ADMIN — HYDROMORPHONE HYDROCHLORIDE 1 MG: 1 INJECTION, SOLUTION INTRAMUSCULAR; INTRAVENOUS; SUBCUTANEOUS at 06:12

## 2022-02-26 RX ADMIN — MIDAZOLAM HYDROCHLORIDE 2 MG: 1 INJECTION, SOLUTION INTRAMUSCULAR; INTRAVENOUS at 10:05

## 2022-02-26 RX ADMIN — ROPIVACAINE HYDROCHLORIDE 15 ML: 5 INJECTION, SOLUTION EPIDURAL; INFILTRATION; PERINEURAL at 10:23

## 2022-02-26 RX ADMIN — CEFAZOLIN 2000 MG: 10 INJECTION, POWDER, FOR SOLUTION INTRAVENOUS at 15:56

## 2022-02-26 RX ADMIN — SODIUM CHLORIDE, POTASSIUM CHLORIDE, SODIUM LACTATE AND CALCIUM CHLORIDE: 600; 310; 30; 20 INJECTION, SOLUTION INTRAVENOUS at 10:04

## 2022-02-26 RX ADMIN — OXYCODONE HYDROCHLORIDE 5 MG: 5 TABLET ORAL at 23:42

## 2022-02-26 RX ADMIN — HYDROMORPHONE HYDROCHLORIDE 1 MG: 1 INJECTION, SOLUTION INTRAMUSCULAR; INTRAVENOUS; SUBCUTANEOUS at 08:16

## 2022-02-26 RX ADMIN — OXYCODONE HYDROCHLORIDE 5 MG: 5 TABLET ORAL at 20:00

## 2022-02-26 RX ADMIN — FENTANYL CITRATE 50 MCG: 50 INJECTION, SOLUTION INTRAMUSCULAR; INTRAVENOUS at 11:42

## 2022-02-26 RX ADMIN — APIXABAN 5 MG: 5 TABLET, FILM COATED ORAL at 23:42

## 2022-02-26 RX ADMIN — SUGAMMADEX 200 MG: 100 INJECTION, SOLUTION INTRAVENOUS at 11:53

## 2022-02-26 RX ADMIN — ONDANSETRON 4 MG: 2 INJECTION INTRAMUSCULAR; INTRAVENOUS at 04:56

## 2022-02-26 RX ADMIN — Medication 100 MG: at 10:10

## 2022-02-26 RX ADMIN — INSULIN GLARGINE 10 UNITS: 100 INJECTION, SOLUTION SUBCUTANEOUS at 21:07

## 2022-02-26 RX ADMIN — ROPIVACAINE HYDROCHLORIDE 20 ML: 5 INJECTION, SOLUTION EPIDURAL; INFILTRATION; PERINEURAL at 10:23

## 2022-02-26 RX ADMIN — MORPHINE SULFATE 4 MG: 4 INJECTION, SOLUTION INTRAMUSCULAR; INTRAVENOUS at 04:57

## 2022-02-26 RX ADMIN — PROPOFOL 140 MG: 10 INJECTION, EMULSION INTRAVENOUS at 10:10

## 2022-02-26 RX ADMIN — FENTANYL CITRATE 50 MCG: 50 INJECTION, SOLUTION INTRAMUSCULAR; INTRAVENOUS at 10:10

## 2022-02-26 RX ADMIN — CEFAZOLIN 2000 MG: 10 INJECTION, POWDER, FOR SOLUTION INTRAVENOUS at 21:18

## 2022-02-26 ASSESSMENT — PULMONARY FUNCTION TESTS
PIF_VALUE: 19
PIF_VALUE: 18
PIF_VALUE: 2
PIF_VALUE: 18
PIF_VALUE: 19
PIF_VALUE: 18
PIF_VALUE: 18
PIF_VALUE: 19
PIF_VALUE: 6
PIF_VALUE: 18
PIF_VALUE: 11
PIF_VALUE: 19
PIF_VALUE: 18
PIF_VALUE: 20
PIF_VALUE: 5
PIF_VALUE: 2
PIF_VALUE: 3
PIF_VALUE: 1
PIF_VALUE: 19
PIF_VALUE: 10
PIF_VALUE: 18
PIF_VALUE: 3
PIF_VALUE: 4
PIF_VALUE: 19
PIF_VALUE: 9
PIF_VALUE: 17
PIF_VALUE: 18
PIF_VALUE: 19
PIF_VALUE: 19
PIF_VALUE: 18
PIF_VALUE: 20
PIF_VALUE: 6
PIF_VALUE: 18
PIF_VALUE: 18
PIF_VALUE: 5
PIF_VALUE: 19
PIF_VALUE: 7
PIF_VALUE: 18
PIF_VALUE: 11
PIF_VALUE: 18
PIF_VALUE: 18
PIF_VALUE: 28
PIF_VALUE: 19
PIF_VALUE: 5
PIF_VALUE: 5
PIF_VALUE: 19
PIF_VALUE: 22
PIF_VALUE: 19
PIF_VALUE: 19
PIF_VALUE: 5
PIF_VALUE: 1
PIF_VALUE: 19
PIF_VALUE: 10
PIF_VALUE: 19
PIF_VALUE: 19
PIF_VALUE: 18
PIF_VALUE: 19
PIF_VALUE: 11
PIF_VALUE: 17
PIF_VALUE: 18
PIF_VALUE: 6
PIF_VALUE: 5
PIF_VALUE: 18
PIF_VALUE: 18
PIF_VALUE: 11
PIF_VALUE: 7
PIF_VALUE: 18
PIF_VALUE: 7
PIF_VALUE: 17
PIF_VALUE: 18
PIF_VALUE: 19
PIF_VALUE: 18
PIF_VALUE: 18
PIF_VALUE: 21
PIF_VALUE: 19
PIF_VALUE: 17
PIF_VALUE: 19
PIF_VALUE: 1
PIF_VALUE: 22
PIF_VALUE: 20
PIF_VALUE: 10
PIF_VALUE: 12
PIF_VALUE: 22
PIF_VALUE: 20
PIF_VALUE: 9
PIF_VALUE: 19
PIF_VALUE: 18
PIF_VALUE: 18
PIF_VALUE: 5
PIF_VALUE: 2
PIF_VALUE: 19
PIF_VALUE: 7
PIF_VALUE: 19
PIF_VALUE: 18
PIF_VALUE: 18
PIF_VALUE: 19
PIF_VALUE: 5
PIF_VALUE: 19
PIF_VALUE: 19
PIF_VALUE: 18
PIF_VALUE: 5
PIF_VALUE: 18
PIF_VALUE: 19
PIF_VALUE: 18
PIF_VALUE: 19
PIF_VALUE: 1
PIF_VALUE: 19
PIF_VALUE: 19
PIF_VALUE: 18
PIF_VALUE: 19
PIF_VALUE: 20
PIF_VALUE: 19
PIF_VALUE: 19
PIF_VALUE: 18
PIF_VALUE: 20
PIF_VALUE: 17
PIF_VALUE: 6

## 2022-02-26 ASSESSMENT — PAIN DESCRIPTION - LOCATION
LOCATION: LEG

## 2022-02-26 ASSESSMENT — PAIN DESCRIPTION - FREQUENCY
FREQUENCY: CONTINUOUS

## 2022-02-26 ASSESSMENT — PAIN DESCRIPTION - PAIN TYPE
TYPE: ACUTE PAIN
TYPE: ACUTE PAIN
TYPE: ACUTE PAIN;SURGICAL PAIN
TYPE: ACUTE PAIN;SURGICAL PAIN
TYPE: ACUTE PAIN

## 2022-02-26 ASSESSMENT — PAIN - FUNCTIONAL ASSESSMENT
PAIN_FUNCTIONAL_ASSESSMENT: 0-10
PAIN_FUNCTIONAL_ASSESSMENT: PREVENTS OR INTERFERES WITH ALL ACTIVE AND SOME PASSIVE ACTIVITIES
PAIN_FUNCTIONAL_ASSESSMENT: PREVENTS OR INTERFERES WITH ALL ACTIVE AND SOME PASSIVE ACTIVITIES

## 2022-02-26 ASSESSMENT — PAIN SCALES - GENERAL
PAINLEVEL_OUTOF10: 9
PAINLEVEL_OUTOF10: 0
PAINLEVEL_OUTOF10: 8
PAINLEVEL_OUTOF10: 7
PAINLEVEL_OUTOF10: 5
PAINLEVEL_OUTOF10: 5
PAINLEVEL_OUTOF10: 4
PAINLEVEL_OUTOF10: 9
PAINLEVEL_OUTOF10: 7
PAINLEVEL_OUTOF10: 5
PAINLEVEL_OUTOF10: 5
PAINLEVEL_OUTOF10: 7

## 2022-02-26 ASSESSMENT — PAIN DESCRIPTION - DESCRIPTORS
DESCRIPTORS: SHARP

## 2022-02-26 ASSESSMENT — PAIN DESCRIPTION - PROGRESSION
CLINICAL_PROGRESSION: GRADUALLY WORSENING
CLINICAL_PROGRESSION: NOT CHANGED

## 2022-02-26 ASSESSMENT — PAIN DESCRIPTION - ORIENTATION
ORIENTATION: LEFT
ORIENTATION: RIGHT;LEFT

## 2022-02-26 ASSESSMENT — ENCOUNTER SYMPTOMS
ABDOMINAL PAIN: 0
ABDOMINAL DISTENTION: 0
VOMITING: 0
CHEST TIGHTNESS: 0
PHOTOPHOBIA: 0
COUGH: 0
SORE THROAT: 0
WHEEZING: 0
EYE DISCHARGE: 0
SHORTNESS OF BREATH: 0

## 2022-02-26 ASSESSMENT — PAIN DESCRIPTION - ONSET
ONSET: ON-GOING
ONSET: PROGRESSIVE

## 2022-02-26 ASSESSMENT — LIFESTYLE VARIABLES: SMOKING_STATUS: 1

## 2022-02-26 NOTE — FLOWSHEET NOTE
Pt returned to floor from PACU. Alert et oriented x4. Denies pain or discomfort at this time. Dressing dry et intact to LLE. Electronically signed by Fabiola Vuong RN on 2/26/2022 at 1:18 PM

## 2022-02-26 NOTE — CONSULTS
Hospitalist Consult    Admit Date: 2/26/2022  PCP: Jacqueline Miller MD    CHIEF COMPLAINT:  fall    HISTORY OF PRESENT ILLNESS:    The patient is a 59 y.o. male presents with a hx of pancreatic cancer under home hospice care, DM2, BPH and atrial fibrillation on reduced dose eliquis, who presents after a mechanical fall at home. The patient lost his balance early this morning, suffered a fall, and had significant pain to his left lower leg, with noted minimal deformity after a twisting motion. The patient has end stage pancreatic cancer not on treatment, under hospice care on home oral morphine. He is unable to ambulate due to fracture and pain. Past Medical History:        Diagnosis Date    Atrial fibrillation ([address]     Diabetes mellitus (HCC)      Past Surgical History:        Procedure Laterality Date    APPENDECTOMY      HERNIA REPAIR       Social History:   Social History     Socioeconomic History    Marital status: Single     Spouse name: Not on file    Number of children: Not on file    Years of education: Not on file    Highest education level: Not on file   Occupational History    Not on file   Tobacco Use    Smoking status: Current Every Day Smoker     Packs/day: 2.00     Types: Cigars    Smokeless tobacco: Never Used   Substance and Sexual Activity    Alcohol use: Not Currently    Drug use: Never    Sexual activity: Not on file   Other Topics Concern    Not on file   Social History Narrative    Not on file     Social Determinants of Health     Financial Resource Strain:     Difficulty of Paying Living Expenses: Not on file   Food Insecurity:     Worried About [address] in the Last Year: Not on file    [address] in the Last Year: Not on file   Transportation Needs:     Lack of Transportation (Medical): Not on file    Lack of Transportation (Non-Medical):  Not on file   Physical Activity:     Days of Exercise per Week: Not on file    Minutes of Exercise per Session: Not on file   Stress:     Feeling of Stress : Not on file   Social Connections:     Frequency of Communication with Friends and Family: Not on file    Frequency of Social Gatherings with Friends and Family: Not on file    Attends Rastafarian Services: Not on file    Active Member of Clubs or Organizations: Not on file    Attends Club or Organization Meetings: Not on file    Marital Status: Not on file   Intimate Partner Violence:     Fear of Current or Ex-Partner: Not on file    Emotionally Abused: Not on file    Physically Abused: Not on file    Sexually Abused: Not on file   Housing Stability:     Unable to Pay for Housing in the Last Year: Not on file    Number of [place] in the Last Year: Not on file    Unstable Housing in the Last Year: Not on file     Family History:   No family history on file. Medications Prior to Admission:    Prior to Admission medications    Medication Sig Start Date End Date Taking? Authorizing Provider   furosemide (LASIX) 20 MG tablet Take 1 tablet by mouth daily 22   Haleigh Glass PA-C       Allergies:  Acetaminophen and Sulfa antibiotics    REVIEW OF SYSTEMS:  Negative except for what is in HPI and 10pt systems reviewed and negative      PHYSICAL EXAM:  Vitals:  /68   Pulse 92   Temp 97.8 °F (36.6 °C) (Oral)   Resp 15   Ht 5' 11\" (1.803 m)   Wt 200 lb (90.7 kg)   SpO2 95%   BMI 27.89 kg/m²   Pulse Ox: SpO2  Av.5 %  Min: 92 %  Max: 99 %  Supplemental O2:      CONSTITUTIONAL:  awake, alert, cooperative, no apparent distress, and appears stated age  HEENT: Normocephalic, PERRLA  NECK: no JVD, no LAD  HEART: RRR, no murmurs, gallops, or rubs  LUNGS: clear to auscultation bilaterally, no wheezes, crackles, or rhonchi.   ABDOMEN: soft/NT/ND, positive BS  MUSCULOSKELETAL: negative for edema, +2 pulses; LLE with mild deformity in lower leg  SKIN: intact without rash or jaundice  NEURO:  CN ii-xii intact and no focal deficits    DATA:  No results found for this or any previous visit (from the past 24 hour(s)). ASSESSMENT AND PLAN:  1)  Hx atrial fibrillation on eliquis       Last dose yesterday morning, hold until post-op clearance from orthopedic surgery to resume. 2)  Hx DM2       SSI, ac/hs checks after OR    3)  Hx end stage pancreatic cancer on hospice       Barron pain control with IV diluaudid    4)  BPH       Cont home med after surgery    4)  LLE tibia and fibula fracture       To OR this am.  Patient has a pre-op risk of 1% of major cardiac complication related to surgery based on his history and RCRI scale, placing him in the low to moderate risk category. He has no contraindications to surgery.     Code status: No Order    Electronically signed by Sharri Glass MD on 2/26/22 at 6:42 AM EST

## 2022-02-26 NOTE — ANESTHESIA PROCEDURE NOTES
Peripheral Block    Patient location during procedure: pre-op  Start time: 2/26/2022 9:32 AM  End time: 2/26/2022 9:45 AM  Staffing  Performed: anesthesiologist   Anesthesiologist: Trish Almonte DO  Preanesthetic Checklist  Completed: patient identified, IV checked, site marked, risks and benefits discussed, surgical consent, monitors and equipment checked, pre-op evaluation, timeout performed, anesthesia consent given, oxygen available and patient being monitored  Peripheral Block  Patient position: supine  Prep: ChloraPrep  Patient monitoring: cardiac monitor, continuous pulse ox, frequent blood pressure checks and IV access  Block type: Sciatic  Laterality: left  Injection technique: single-shot  Guidance: nerve stimulator and ultrasound guided  Local infiltration: ropivacaine  Infiltration strength: 0.5 %  Dose: 20 mL  Popliteal  Provider prep: mask and sterile gloves (Sterile probe cover)  Local infiltration: ropivacaine  Needle  Needle type: combined needle/nerve stimulator   Needle gauge: 22 G  Needle length: 10 cm  Needle localization: anatomical landmarks, ultrasound guidance and nerve stimulator  Assessment  Injection assessment: negative aspiration for heme, no paresthesia on injection and local visualized surrounding nerve on ultrasound  Paresthesia pain: immediately resolved  Slow fractionated injection: yes  Hemodynamics: stable  Additional Notes  Ultrasound image printed and saved in patient chart.     Sterile probe cover used    Medications Administered  Ropivacaine (NAROPIN) injection 0.5%, 20 mL  Reason for block: post-op pain management and at surgeon's request

## 2022-02-26 NOTE — OP NOTE
Operative Note      Patient: Fredo Burgess  YOB: 1960  MRN: [id number]    Date of Procedure: 2/26/2022    Pre-Op Diagnosis: tibia fracture    Post-Op Diagnosis: Same       Procedure(s):  LEFT TIBIA NAIL INSERTION (intramedullary nail fixation for left tibia fracture and nonop management proximal fibula fracture with manipulation.)      Surgeon(s):  Marcelo Lundborg, MD    Assistant:    Assistant: Vero Ackerman    Anesthesia: General    Estimated Blood Loss (mL): less than 656     Complications: None    Specimens:   * No specimens in log *    Implants:  Implant Name Type Inv. Item Serial No.  Lot No. LRB No. Used Action   NAIL IM L360MM [id number] UNIV TIB LT GRN TI SAGE MARIO FLUT - [id number]  NAIL IM L360MM [id number] UNIV TIB LT GRN TI SAGE MARIO FLUT  [employer] [id number] Left 1 Implanted   SCREW BNE L60MM DIA5MM ST TIB LT GRN TI ST SAGE MARIO FULL - [id number]  SCREW BNE L60MM DIA5MM ST TIB LT GRN TI ST SAGE MARIO FULL  [employer] [id number] Left 1 Implanted   SCREW BNE L68MM DIA5MM COR DIA4. 3MM FEM TI ST MARIO DBL LD - [id number]  SCREW BNE L68MM DIA5MM COR DIA4. 3MM FEM TI ST MARIO DBL LD  DE[employer] [id number] Left 1 Implanted   SCREW BNE L42MM DIA5MM TIB LT GRN TI ST SAGE MARIO FULL THRD - [id number]  SCREW BNE L42MM DIA5MM TIB LT GRN TI ST SAGE MARIO FULL THRD  DE[employer] [id number] Left 1 Implanted   SCREW BNE L36MM DIA5MM TIB LT GRN TI ST SAGE MARIO FULL THRD - [id number]  SCREW BNE L36MM DIA5MM TIB LT GRN TI ST SAGE MARIO FULL THRD  DEPU[employer] [id number] Left 1 Implanted         Drains: * No LDAs found *    Findings: Displaced tibia and fibula fracture    Detailed Description of Procedure:    Indications: 75-year-old male injured his a left leg resulting in a displaced tibia and fibula fracture is recommended for surgery for intramedullary nail fixation for right tibia fracture    Risks, benefits and alternatives of surgery were discussed including but not limited to infection, bleeding, neurovascular injury, persistent pain dysfunction and long-term disability. Cardiovascular pulmonary complications from anesthesia including death and DVT. Patient accepts these risks. We discussed specifically hardware related complications and cut out, failure, breakage, wound healing complications including dehiscence, infection, failure, hardware pain, malunion, nonunion and the need for revision surgeries including hardware removal as well as permanent loss in strength, function or motion. Patient identified in the preop area. Left lower extremity marked and confirmed with patient as the operative site. Brought back to the operating room and anesthetized under general anesthesia. Left lower extremity was then prepped and draped in standard sterile fashion. Patient, site and procedure were confirmed with timeout. Everyone in the room agreed. Preop antibiotics were given. We made a direct midline incision over the patellar tendon extending it to the patella proximally. After the skin was incised we then made a direct incision in the midportion of the patellar tendon. Placed a provisional guidewire at the top of the tibia. We confirmed under fluoroscopic image that it was collinear with the lateral tibial spine and just off the anterior lip of the articular surface. Guidewire was advanced into the proximal tibia. Once were satisfied with location within did a rigid reamer over guidewire assistance. Soft tissue protectors were used. Then placed a ball-tipped guidewire through the tibia to the level of the fracture site. Patient had a short oblique fracture with some extension of distally. We able to adequately reduce it under direct manipulation. Skin was not incised. After confirmed with our reduction guidewire was placed distally. We confirmed our a distal wire was in the center position of the ankle. We then measured and selected a size 360 mm nail.   After the fracture was adequately reduced, we then sequentially began reaming the tibia. We continue to sequentially ream until we had appropriate chatter and fill of the metaphyseal and diaphyseal area. We selected a size 11 mm nail. Nail was in the place over guidewire assistance and malleted distal end of the tibia. Once we were satisfied with his location, we then placed proximal and distal locking screws. These were done in sequential technique under fluoroscopic imaging. We assured that the fracture was adequately reduced without evidence of gapping during screw fixation. Our distal locking screws were placed under a perfect circles technique. This concluded the procedure. All instrumentation was removed. Final fluoroscopic images were taken we are satisfied with the length, alignment of the fracture site. Wounds irrigated with normal saline. The patellar tendon arthrotomy was closed with interrupted #1 Vicryl. 2-0 Vicryl subcutaneous and skin was stapled shut. Bacitracin and Xeroform were applied. Patient placed in a well-padded plaster splint and sent to the PACU in stable condition. Josafat MONTANA was required during the entirety of the procedure for positioning, instrumentation, limb manipulation, retraction and wound closure. Certified scrub tech also involved during the procedure and was managing the back table for instrumentation and surgical set up.       Electronically signed by Stacia Roca MD on 2/26/2022 at 12:28 PM

## 2022-02-26 NOTE — H&P
Inpatient Consultation      Genesis Ybarra (1960)  2/26/2022    Reason for Consult: Left tibia/fibular fracture    IMPRESSION/RECOMMENDATIONS:    Assessment: : Left tibia/fibular fracture    Plan:  1) recommended intramedullary nail fixation/ORIF left tibia and fibula  2) risks, benefits and alternatives of surgery were discussed including but not limited to infection, bleeding, neurovascular injury, persistent pain dysfunction and long-term disability. Cardiovascular pulmonary complications from anesthesia including death and DVT. Patient accepts these risks. We discussed specifically hardware related complications going cut out, failure, breakage, malunion, nonunion, hardware pain, loss in strength function or motion and the possible need for revision surgeries. We also discussed his increased perioperative morbidity and mortality given his cancer diagnosis. Family and patient understand the risks and agrees to procedure. Patient has been off of Eliquis for 24 hours. Patient is a low risk for bleeding complication given the limited extent of surgery for nailing. 3) medically optimized for surgery. David Chen MD    CHIEF COMPLAINT: Displaced left tibia and fibula fracture    HISTORY OF PRESENT ILLNESS:                The patient is a 59 y.o. male who presents with above chief complaint. History: The patient has history of fall on his left leg resulting in displaced the tibia and fibula fracture. Patient moderate to severe intensity pain was unable to ambulate. Simply brought into the emergency department for evaluation. Patient is currently on hospice given he is a end-stage pancreatic cancer. He they attempted to ambulate and make him nonweightbearing with potential follow-up the patient is in severe intensity pain is unable to tolerate even a moving in bed. At the top discussion family and the patient elected to obtain a orthopedic surgical consultation.   He is complained of moderate aching pain and soreness in the left leg. Denies any injuries to the remainder of his extremities. Past Medical History:        Diagnosis Date    [facility] fibrillation ([facility] [address] [facility]     Diabetes mellitus ([facility] [address] [facility]      Past Surgical History:        Procedure Laterality Date    APPENDECTOMY      HERNIA REPAIR       Current Medications:   Current Facility-Administered Medications: sodium chloride flush 0.9 % injection 5-40 mL, 5-40 mL, IntraVENous, 2 times per day  sodium chloride flush 0.9 % injection 5-40 mL, 5-40 mL, IntraVENous, PRN  0.9 % sodium chloride infusion, 25 mL, IntraVENous, PRN  ondansetron (ZOFRAN-ODT) disintegrating tablet 4 mg, 4 mg, Oral, Q8H PRN **OR** ondansetron (ZOFRAN) injection 4 mg, 4 mg, IntraVENous, Q6H PRN  polyethylene glycol (GLYCOLAX) packet 17 g, 17 g, Oral, Daily PRN  tranexamic acid (CYKLOKAPRON) 1,000 mg in dextrose 5 % 100 mL IVPB, 1,000 mg, IntraVENous, On Call to OR  ceFAZolin (ANCEF) 2000 mg in dextrose 5 % 100 mL IVPB, 2,000 mg, IntraVENous, On Call to OR  HYDROmorphone (DILAUDID) injection 1 mg, 1 mg, IntraVENous, Q3H PRN  glucose (GLUTOSE) 40 % oral gel 15 g, 15 g, Oral, PRN  dextrose 50 % IV solution, 12.5 g, IntraVENous, PRN  glucagon (rDNA) injection 1 mg, 1 mg, IntraMUSCular, PRN  dextrose 5 % solution, 100 mL/hr, IntraVENous, PRN  insulin glargine (LANTUS) injection vial 10 Units, 10 Units, SubCUTAneous, Nightly  insulin lispro (HUMALOG) injection vial 0-12 Units, 0-12 Units, SubCUTAneous, TID WC  insulin lispro (HUMALOG) injection vial 0-6 Units, 0-6 Units, SubCUTAneous, Nightly  lactated ringers infusion, , IntraVENous, Continuous  lidocaine PF 1 % injection 1 mL, 1 mL, IntraDERmal, Once  Allergies:  Acetaminophen and Sulfa antibiotics    Social History:   TOBACCO:   reports that he has been smoking cigars. He has been smoking about 2.00 packs per day. He has never used smokeless tobacco.  ETOH:   reports previous alcohol use.   DRUGS:   reports no history of drug use.  Family History:   No family history on file. Review of Systems:  Reviewed from initial History, Physical exam and Assessment of Emergency Room Physician. I agree with their findings. Physical Exam:    Ortho Exam   Constitutional: well nourished, well developed, appears stated age. Alert and oriented x3. Responds appropriately to questions and commands  Head: Atraumatic  ENT: trachea midline  Eye: EOMI  Lung: Respiration regular, no audible wheeze  Abdomen: Soft, non tender  Cardiovascular: pulse regular to extremity  Extremity: Neurovascularly intact, palpable pulse, warm and well perfused. Skin intact and sensation intact to light touch  Focused Orthopedic Exam:  Left lower extremity: Neurovascularly intact. Palpable pedal pulse, warm and well-perfused, skin intact, sensation intact light touch. Patient is a moderately swollen and bruised. Limited strength and stability secondary to fracture. Bilateral upper extremity and right lower extremity show full passive motion no pain no deformity    DATA:    CBC:   Lab Results   Component Value Date    WBC 5.3 02/26/2022    RBC 3.33 02/26/2022    HGB 8.7 02/26/2022    HCT 28.1 02/26/2022    MCV 84.5 02/26/2022    MCH 26.3 02/26/2022    MCHC 31.1 02/26/2022    RDW 19.1 02/26/2022     02/26/2022     CBC:    Lab Results   Component Value Date    WBC 5.3 02/26/2022    RBC 3.33 02/26/2022    HGB 8.7 02/26/2022    HCT 28.1 02/26/2022     02/26/2022     BMP:    Lab Results   Component Value Date     02/26/2022    K 4.0 02/26/2022    CL 97 02/26/2022    CO2 24 02/26/2022    BUN 7 02/26/2022    CREATININE 0.52 02/26/2022    CALCIUM 7.8 02/26/2022    GFRAA >60.0 02/26/2022    LABGLOM >60.0 02/26/2022    GLUCOSE 130 02/26/2022     Blood Culture:  No components found for: [address] [facility]      Radiology: Displaced fracture of the left tibial shaft and fibula. Shortening, angulation and displacement noted.

## 2022-02-26 NOTE — ANESTHESIA POSTPROCEDURE EVALUATION
Department of Anesthesiology  Postprocedure Note    Patient: Galindo Calhoun  MRN: [id number]  YOB: 1960  Date of evaluation: 2/26/2022  Time:  12:19 PM     Procedure Summary     Date: 02/26/22 Room / Location: [address]    Anesthesia Start: 1005 Anesthesia Stop:     Procedures:       LEFT TIBIA NAIL INSERTION (Left )      Procedure Not Yet Scheduled Diagnosis: (tibia fracture)    Surgeons: Radha Mcfadden MD Responsible Provider: Ana Rosa De La Torre DO    Anesthesia Type: general, MAC, regional ASA Status: 3          Anesthesia Type: GETA, saphenous and popliteal    Kae Phase I:   10    Kae Phase II:      Last vitals: Reviewed and per EMR flowsheets.        Anesthesia Post Evaluation    Patient location during evaluation: bedside  Patient participation: complete - patient participated  Level of consciousness: awake and awake and alert  Pain score: 0  Airway patency: patent  Nausea & Vomiting: no nausea and no vomiting  Complications: no  Cardiovascular status: blood pressure returned to baseline and hemodynamically stable  Respiratory status: acceptable  Hydration status: euvolemic

## 2022-02-26 NOTE — ANESTHESIA PROCEDURE NOTES
Peripheral Block    Patient location during procedure: pre-op  Start time: 2/26/2022 9:32 AM  End time: 2/26/2022 9:45 AM  Staffing  Performed: anesthesiologist   Anesthesiologist: Michelle Lemus DO  Preanesthetic Checklist  Completed: patient identified, IV checked, site marked, risks and benefits discussed, surgical consent, monitors and equipment checked, pre-op evaluation, timeout performed, anesthesia consent given, oxygen available and patient being monitored  Peripheral Block  Patient position: supine  Prep: ChloraPrep  Patient monitoring: cardiac monitor, continuous pulse ox, frequent blood pressure checks and IV access  Block type: Saphenous  Laterality: left  Injection technique: single-shot  Guidance: ultrasound guided  Local infiltration: ropivacaine  Infiltration strength: 0.5 %  Dose: 15 mL  Provider prep: mask and sterile gloves (Sterile probe cover)  Local infiltration: ropivacaine  Needle  Needle type: combined needle/nerve stimulator   Needle gauge: 22 G  Needle length: 10 cm  Needle localization: anatomical landmarks and ultrasound guidance  Assessment  Injection assessment: negative aspiration for heme, no paresthesia on injection and local visualized surrounding nerve on ultrasound  Paresthesia pain: immediately resolved  Slow fractionated injection: yes  Hemodynamics: stable  Additional Notes  Ultrasound image printed and saved in patient chart.     Sterile probe cover used    Medications Administered  Ropivacaine (NAROPIN) injection 0.5%, 15 mL  Reason for block: post-op pain management and at surgeon's request

## 2022-02-26 NOTE — FLOWSHEET NOTE
Pt admitted to floor from ER. Alert et oriented x4. Assessment completed. VS obtained. Pt scheduled for am surgery d/t left tib/fib fracture. Electronically signed by Henri Fontenot RN on 2/26/2022 at 9:34 AM

## 2022-02-26 NOTE — ANESTHESIA PRE PROCEDURE
Department of Anesthesiology  Preprocedure Note       Name:  Tiffanie Be   Age:  59 y.o.  :  1960                                          MRN:  [id number]         Date:  2022      Surgeon: Jacqueline Pi):  Lydia Jacobson MD    Procedure: Procedure(s):  LEFT TIBIA NAIL INSERTION    Medications prior to admission:   Prior to Admission medications    Medication Sig Start Date End Date Taking? Authorizing Provider   albuterol sulfate  (90 Base) MCG/ACT inhaler INHALE 1 OR 2 PUFFS BY MOUTH FOUR TIMES A DAY AS NEEDED FOR SHORTNESS OF BREATH OR WHEEZING. 21  Yes Historical Provider, MD   apixaban (ELIQUIS) 5 MG TABS tablet TAKE ONE TABLET BY MOUTH TWICE A DAY AS DIRECTED BY THE [facility] ([phone] EXT. [phone]) 21  Yes Historical Provider, MD   glucose 4 g chewable tablet CHEW AND SWALLOW FOUR TABLETS BY MOUTH  AS NEEDED FOR LOW SUGAR. CHECK BLOOD SUGAR IN 15 MINUTES AND REPEAT DOSE IF STILL LOW.  21  Yes Historical Provider, MD   dilTIAZem (TIAZAC) 240 MG extended release capsule TAKE ONE CAPSULE BY MOUTH EVERY MORNING 21  Yes Historical Provider, MD   finasteride (PROSCAR) 5 MG tablet TAKE ONE TABLET BY MOUTH EVERY DAY (DO NOT CRUSH) 21  Yes Historical Provider, MD   FLUoxetine (PROZAC) 20 MG capsule TAKE ONE CAPSULE BY MOUTH EVERY MORNING 21  Yes Historical Provider, MD   gabapentin (NEURONTIN) 300 MG capsule TAKE ONE CAPSULE BY MOUTH TWICE A DAY 21  Yes Historical Provider, MD   insulin glargine (LANTUS;BASAGLAR) 100 UNIT/ML injection pen INJECT 20 UNITS SUBCUTANEOUSLY EVERY MORNING (DISCARD PEN 28 DAYS AFTER FIRST USE) 21  Yes Historical Provider, MD   latanoprost (XALATAN) 0.005 % ophthalmic solution INSTILL 1 DROP IN EACH EYE EVERY EVENING (REFRIGERATE UNTIL OPENED; DISCARD 6 WEEKS AFTER OPENING) 21  Yes Historical Provider, MD   Magnesium Oxide 420 MG TABS TAKE ONE TABLET BY MOUTH EVERY DAY 21  Yes Historical Provider, MD   prazosin (MINIPRESS) 2 MG capsule TAKE THREE CAPSULES BY MOUTH AT BEDTIME 8/11/21  Yes Historical Provider, MD   valproic acid (DEPAKENE) 250 MG capsule TAKE ONE CAPSULE BY MOUTH EVERY DAY 1/3/22  Yes Historical Provider, MD   risperiDONE (RISPERDAL) 1 MG tablet TAKE ONE TABLET BY MOUTH AT BEDTIME 11/30/21  Yes Historical Provider, MD   furosemide (LASIX) 20 MG tablet Take 1 tablet by mouth daily 1/1/22  Yes Tevin Marquis PA-C   acetaminophen (TYLENOL) 325 MG tablet TAKE 2 TABLETS BY MOUTH EVERY FOUR HOURS AS NEEDED FOR MILD PAIN/ FEVER 2/16/22   Historical Provider, MD   docusate sodium (COLACE) 100 MG capsule TAKE 1 CAPSULE BY MOUTH TWO TIMES A DAY AS NEEDED FOR CONSTIPATION 2/2/22   Historical Provider, MD   traMADol (ULTRAM) 50 MG tablet TAKE 1 TABLET BY MOUTH EVERY FOUR HOURS AS NEEDED FOR MODERATE TO SEVERE PAIN 2/18/22   Historical Provider, MD   potassium chloride (KLOR-CON) 10 MEQ extended release tablet TAKE 1 TABLET BY MOUTH EVERY DAY 2/18/22   Historical Provider, MD       Current medications:    Current Facility-Administered Medications   Medication Dose Route Frequency Provider Last Rate Last Admin    sodium chloride flush 0.9 % injection 5-40 mL  5-40 mL IntraVENous 2 times per day Ling Marie MD        sodium chloride flush 0.9 % injection 5-40 mL  5-40 mL IntraVENous PRN Ling Marie MD        0.9 % sodium chloride infusion  25 mL IntraVENous PRN Ling Marie MD        ondansetron (ZOFRAN-ODT) disintegrating tablet 4 mg  4 mg Oral Q8H PRN Ling Marie MD        Or    ondansetron [facility]) injection 4 mg  4 mg IntraVENous Q6H PRN Ling Marie MD        polyethylene glycol [facility]) packet 17 g  17 g Oral Daily PRN Ling Marie MD        tranexamic acid (CYKLOKAPRON) 1,000 mg in dextrose 5 % 100 mL IVPB  1,000 mg IntraVENous On Call to Dionne Ziegler MD        ceFAZolin (ANCEF) 2000 mg in dextrose 5 % 100 mL IVPB  2,000 mg IntraVENous On Call to Dionne Ziegler MD       [facility] Not Detected 02/26/2022           Anesthesia Evaluation  Patient summary reviewed and Nursing notes reviewed no history of anesthetic complications:   Airway: Mallampati: II  TM distance: >3 FB   Neck ROM: full  Mouth opening: > = 3 FB Dental: normal exam         Pulmonary:normal exam    (+) asthma: current smoker          Patient did not smoke on day of surgery. Cardiovascular:Negative CV ROS  Exercise tolerance: good (>4 METS),   (+) dysrhythmias: atrial fibrillation,       ECG reviewed               Beta Blocker:  Not on Beta Blocker         Neuro/Psych:   Negative Neuro/Psych ROS              GI/Hepatic/Renal: Neg GI/Hepatic/Renal ROS            Endo/Other:    (+) DiabetesType II DM, , blood dyscrasia: anticoagulation therapy and anemia:., electrolyte abnormalities, malignancy/cancer. Pt had PAT visit. ROS comment: Hospice  End stage pancreatic cancer Abdominal:             Vascular: negative vascular ROS. Other Findings:             Anesthesia Plan      general, MAC and regional     ASA 3     (ETT  Popliteal and saphenous)  Induction: intravenous. MIPS: Postoperative opioids intended and Prophylactic antiemetics administered. Anesthetic plan and risks discussed with patient. Plan discussed with CRNA.     Attending anesthesiologist reviewed and agrees with Yuan Conway DO   2/26/2022

## 2022-02-26 NOTE — ED NOTES
Report called to [facility]. All questions and concerns addressed and answered. VSS and respirations e/u on RA. Pt denies any additional needs at this time.        Haja Marrero RN  02/26/22 0398

## 2022-02-26 NOTE — ED PROVIDER NOTES
[address] [facility] ED  eMERGENCY dEPARTMENT eNCOUnter      Pt Name: Nancy Carson  MRN: [id number]  [place] 1960  Date of evaluation: 2/26/2022  Provider: Elizabeth Baptiste MD    CHIEF COMPLAINT       Chief Complaint   Patient presents with    Fall     pt states lost his balance this morning and fell         HISTORY OF PRESENT ILLNESS   (Location/Symptom, Timing/Onset,Context/Setting, Quality, Duration, Modifying Factors, Severity)  Note limiting factors. Nancy Carson is a 59 y.o. male who presents to the emergency department for evaluation of left lower leg pain after a fall. Patient is a resident of hospice because of end-stage pancreatic cancer. He had gotten up to get his morphine medications filled part of it and then twisting fell to the ground. Complaining of left lower leg pain with slight deformity. No other complaints. Pain level is moderate but becomes more severe with movement. No numbness or paresthesias. No longer receiving chemotherapy. HPI    NursingNotes were reviewed. REVIEW OF SYSTEMS    (2-9 systems for level 4, 10 or more for level 5)     Review of Systems   Constitutional: Negative for chills and diaphoresis. HENT: Negative for congestion, ear pain, mouth sores and sore throat. Eyes: Negative for photophobia and discharge. Respiratory: Negative for cough, chest tightness, shortness of breath and wheezing. Cardiovascular: Negative for chest pain and palpitations. Gastrointestinal: Negative for abdominal distention, abdominal pain and vomiting. Endocrine: Negative for cold intolerance. Genitourinary: Negative for difficulty urinating. Musculoskeletal: Negative for arthralgias. Skin: Negative for pallor and rash. Allergic/Immunologic: Negative for immunocompromised state. Neurological: Negative for dizziness and syncope. Hematological: Negative for adenopathy. Psychiatric/Behavioral: Negative for agitation and hallucinations.    All other systems reviewed and are negative. Except as noted above the remainder of the review of systems was reviewed and negative. PAST MEDICAL HISTORY     Past Medical History:   Diagnosis Date    Atrial fibrillation ([facility]     Diabetes mellitus ([facility]          SURGICALHISTORY       Past Surgical History:   Procedure Laterality Date    APPENDECTOMY      HERNIA REPAIR           CURRENT MEDICATIONS       Previous Medications    FUROSEMIDE (LASIX) 20 MG TABLET    Take 1 tablet by mouth daily       ALLERGIES     Acetaminophen and Sulfa antibiotics    FAMILY HISTORY     No family history on file. SOCIAL HISTORY       Social History     Socioeconomic History    Marital status: Single     Spouse name: Not on file    Number of children: Not on file    Years of education: Not on file    Highest education level: Not on file   Occupational History    Not on file   Tobacco Use    Smoking status: Current Every Day Smoker     Packs/day: 2.00     Types: Cigars    Smokeless tobacco: Never Used   Substance and Sexual Activity    Alcohol use: Not Currently    Drug use: Never    Sexual activity: Not on file   Other Topics Concern    Not on file   Social History Narrative    Not on file     Social Determinants of Health     Financial Resource Strain:     Difficulty of Paying Living Expenses: Not on file   Food Insecurity:     Worried About Running Out of Food in the Last Year: Not on file    Ihsan of Food in the Last Year: Not on file   Transportation Needs:     Lack of Transportation (Medical): Not on file    Lack of Transportation (Non-Medical):  Not on file   Physical Activity:     Days of Exercise per Week: Not on file    Minutes of Exercise per Session: Not on file   Stress:     Feeling of Stress : Not on file   Social Connections:     Frequency of Communication with Friends and Family: Not on file    Frequency of Social Gatherings with Friends and Family: Not on file    Attends Evangelical Services: Not on file    Active Member of Clubs or Organizations: Not on file    Attends Club or Organization Meetings: Not on file    Marital Status: Not on file   Intimate Partner Violence:     Fear of Current or Ex-Partner: Not on file    Emotionally Abused: Not on file    Physically Abused: Not on file    Sexually Abused: Not on file   Housing Stability:     Unable to Pay for Housing in the Last Year: Not on file    Number of [place] in the Last Year: Not on file    Unstable Housing in the Last Year: Not on file       SCREENINGS    [place] Coma Scale  Eye Opening: Spontaneous  Best Verbal Response: Oriented  Best Motor Response: Obeys commands  [place] Coma Scale Score: 15 @FLOW([id number])@      PHYSICAL EXAM    (up to 7 for level 4, 8 or more for level 5)     ED Triage Vitals   BP Temp Temp Source Pulse Resp SpO2 Height Weight   02/26/22 0434 02/26/22 0446 02/26/22 0446 02/26/22 0434 02/26/22 0434 02/26/22 0434 02/26/22 0446 02/26/22 0446   108/69 97.8 °F (36.6 °C) Oral 90 20 99 % 5' 11\" (1.803 m) 200 lb (90.7 kg)       Physical Exam  Vitals and nursing note reviewed. Constitutional:       Appearance: He is well-developed. HENT:      Head: Normocephalic. Nose: Nose normal.   Eyes:      Conjunctiva/sclera: Conjunctivae normal.      Pupils: Pupils are equal, round, and reactive to light. Cardiovascular:      Rate and Rhythm: Normal rate and regular rhythm. Heart sounds: Normal heart sounds. Pulmonary:      Effort: Pulmonary effort is normal.      Breath sounds: Normal breath sounds. Abdominal:      General: Bowel sounds are normal.      Palpations: Abdomen is soft. Tenderness: There is no abdominal tenderness. There is no guarding. Musculoskeletal:         General: Tenderness and signs of injury present. Normal range of motion. Cervical back: Normal range of motion and neck supple. Left lower leg: Deformity and tenderness present.         Legs:    Skin: General: Skin is warm and dry. Neurological:      Mental Status: He is alert and oriented to person, place, and time. DIAGNOSTIC RESULTS     EKG: All EKG's are interpreted by the Emergency Department Physician who either signs or Co-signsthis chart in the absence of a cardiologist.    She shows sinus rhythm with incomplete right bundle branch block pattern. Nonspecific T wave abnormality. No ischemia. Slightly prolonged QT. Normal axis. Abnormal EKG      RADIOLOGY:   Non-plain filmimages such as CT, Ultrasound and MRI are read by the radiologist. Plain radiographic images are visualized and preliminarily interpreted by the emergency physician with the below findings:      Interpretation per the Radiologist below, if available at the time ofthis note:    XR TIBIA FIBULA LEFT (2 VIEWS)    (Results Pending)   XR CHEST PORTABLE    (Results Pending)         ED BEDSIDE ULTRASOUND:   Performed by ED Physician - none    LABS:  Labs Reviewed   COMPREHENSIVE METABOLIC PANEL   CBC WITH AUTO DIFFERENTIAL   PROTIME-INR       All other labs were within normal range or not returned as of this dictation. EMERGENCY DEPARTMENT COURSE and DIFFERENTIAL DIAGNOSIS/MDM:   Vitals:    Vitals:    02/26/22 0446 02/26/22 0456 02/26/22 0501 02/26/22 0545   BP: 105/68 99/64 99/64 107/68   Pulse: 95   92   Resp: 18   15   Temp: 97.8 °F (36.6 °C)      TempSrc: Oral      SpO2: 92%  96% 95%   Weight: 200 lb (90.7 kg)      Height: 5' 11\" (1.803 m)           MDM patient found to have a Maisonneuve fracture of the left lower leg. Pain control is going to be a significant problem. He took morphine prehospital was given morphine here and still has significant discomfort after splinting. Also, he lives at home with hospice treatment. He normally ambulates with a walker and will be unable to do that.   Patient and family are requesting him be removed from hospice so he can receive definitive treatment here in the hospital and better pain control. Dr. Bartolo Crenshaw will be admitting the patient and we are working to clear him for surgery this morning. CONSULTS:  IP CONSULT TO HOSPITALIST    PROCEDURES:  Unless otherwise noted below, none     Procedures    FINAL IMPRESSION      1. Closed fracture of left tibia and fibula, initial encounter          DISPOSITION/PLAN   DISPOSITION Decision To Admit 02/26/2022 06:09:17 AM      PATIENT REFERRED TO:  No follow-up provider specified.     DISCHARGE MEDICATIONS:  New Prescriptions    No medications on file          (Please note that portions of this note were completed with a voice recognition program.  Efforts were made to edit the dictations but occasionally words are mis-transcribed.)    Rey Floyd MD (electronically signed)  Attending Emergency Physician          Rey Floyd MD  02/26/22 9888       Rey Floyd MD  02/26/22 1937       Rey Floyd MD  03/02/22 6021

## 2022-02-26 NOTE — ED NOTES
Splint applied to left lower leg. Pulses intact. Patient tolerated well and verbalizes improvement in comfort level.      Ajith Euceda RN  02/26/22 1445

## 2022-02-26 NOTE — ED TRIAGE NOTES
Pt brought in by ems from home. Pt was up to get medicine and spilled it. When he turned quickly he lost his balance and fell, injuring his left leg. Leg is splinted per ems. Pt did take his dose of morphine before ems arrival. Pt is on home hospice.  Pt is a/o x4 no sob or acute distress noted

## 2022-02-27 ENCOUNTER — APPOINTMENT (OUTPATIENT)
Dept: ULTRASOUND IMAGING | Age: 62
DRG: 493 | End: 2022-02-27
Payer: OTHER GOVERNMENT

## 2022-02-27 ENCOUNTER — APPOINTMENT (OUTPATIENT)
Dept: GENERAL RADIOLOGY | Age: 62
DRG: 493 | End: 2022-02-27
Payer: OTHER GOVERNMENT

## 2022-02-27 LAB
ANION GAP SERPL CALCULATED.3IONS-SCNC: 8 MEQ/L (ref 9–15)
BUN BLDV-MCNC: 7 MG/DL (ref 8–23)
CALCIUM SERPL-MCNC: 7.6 MG/DL (ref 8.5–9.9)
CHLORIDE BLD-SCNC: 100 MEQ/L (ref 95–107)
CO2: 27 MEQ/L (ref 20–31)
CREAT SERPL-MCNC: 0.47 MG/DL (ref 0.7–1.2)
GFR AFRICAN AMERICAN: >60
GFR NON-AFRICAN AMERICAN: >60
GLUCOSE BLD-MCNC: 131 MG/DL (ref 70–99)
GLUCOSE BLD-MCNC: 154 MG/DL (ref 70–99)
GLUCOSE BLD-MCNC: 176 MG/DL (ref 70–99)
GLUCOSE BLD-MCNC: 185 MG/DL (ref 70–99)
GLUCOSE BLD-MCNC: 193 MG/DL (ref 70–99)
HBA1C MFR BLD: 6.4 % (ref 4.8–5.9)
HBA1C MFR BLD: 6.5 % (ref 4.8–5.9)
HCT VFR BLD CALC: 25.3 % (ref 42–52)
HEMOGLOBIN: 7.7 G/DL (ref 14–18)
MCH RBC QN AUTO: 25.7 PG (ref 27–31.3)
MCHC RBC AUTO-ENTMCNC: 30.7 % (ref 33–37)
MCV RBC AUTO: 83.8 FL (ref 80–100)
PDW BLD-RTO: 19.3 % (ref 11.5–14.5)
PERFORMED ON: ABNORMAL
PLATELET # BLD: 112 K/UL (ref 130–400)
POTASSIUM REFLEX MAGNESIUM: 4 MEQ/L (ref 3.4–4.9)
RBC # BLD: 3.02 M/UL (ref 4.7–6.1)
SODIUM BLD-SCNC: 135 MEQ/L (ref 135–144)
WBC # BLD: 5.6 K/UL (ref 4.8–10.8)

## 2022-02-27 PROCEDURE — 96375 TX/PRO/DX INJ NEW DRUG ADDON: CPT

## 2022-02-27 PROCEDURE — 6370000000 HC RX 637 (ALT 250 FOR IP): Performed by: FAMILY MEDICINE

## 2022-02-27 PROCEDURE — G0378 HOSPITAL OBSERVATION PER HR: HCPCS

## 2022-02-27 PROCEDURE — 80048 BASIC METABOLIC PNL TOTAL CA: CPT

## 2022-02-27 PROCEDURE — 6360000002 HC RX W HCPCS: Performed by: ORTHOPAEDIC SURGERY

## 2022-02-27 PROCEDURE — 94150 VITAL CAPACITY TEST: CPT

## 2022-02-27 PROCEDURE — 2580000003 HC RX 258: Performed by: ORTHOPAEDIC SURGERY

## 2022-02-27 PROCEDURE — 83036 HEMOGLOBIN GLYCOSYLATED A1C: CPT

## 2022-02-27 PROCEDURE — 93971 EXTREMITY STUDY: CPT

## 2022-02-27 PROCEDURE — 6370000000 HC RX 637 (ALT 250 FOR IP): Performed by: ORTHOPAEDIC SURGERY

## 2022-02-27 PROCEDURE — 73552 X-RAY EXAM OF FEMUR 2/>: CPT

## 2022-02-27 PROCEDURE — 96376 TX/PRO/DX INJ SAME DRUG ADON: CPT

## 2022-02-27 PROCEDURE — 6360000002 HC RX W HCPCS: Performed by: FAMILY MEDICINE

## 2022-02-27 PROCEDURE — 96374 THER/PROPH/DIAG INJ IV PUSH: CPT

## 2022-02-27 PROCEDURE — 85027 COMPLETE CBC AUTOMATED: CPT

## 2022-02-27 PROCEDURE — 36415 COLL VENOUS BLD VENIPUNCTURE: CPT

## 2022-02-27 PROCEDURE — 73590 X-RAY EXAM OF LOWER LEG: CPT

## 2022-02-27 RX ADMIN — OXYCODONE HYDROCHLORIDE 5 MG: 5 TABLET ORAL at 14:28

## 2022-02-27 RX ADMIN — APIXABAN 5 MG: 5 TABLET, FILM COATED ORAL at 08:24

## 2022-02-27 RX ADMIN — OXYCODONE HYDROCHLORIDE 5 MG: 5 TABLET ORAL at 09:30

## 2022-02-27 RX ADMIN — OXYCODONE HYDROCHLORIDE 5 MG: 5 TABLET ORAL at 20:29

## 2022-02-27 RX ADMIN — Medication 10 ML: at 20:30

## 2022-02-27 RX ADMIN — OXYCODONE HYDROCHLORIDE 5 MG: 5 TABLET ORAL at 05:00

## 2022-02-27 RX ADMIN — MORPHINE SULFATE 2 MG: 2 INJECTION, SOLUTION INTRAMUSCULAR; INTRAVENOUS at 00:29

## 2022-02-27 RX ADMIN — APIXABAN 5 MG: 5 TABLET, FILM COATED ORAL at 20:29

## 2022-02-27 RX ADMIN — MORPHINE SULFATE 2 MG: 2 INJECTION, SOLUTION INTRAMUSCULAR; INTRAVENOUS at 08:02

## 2022-02-27 RX ADMIN — INSULIN GLARGINE 10 UNITS: 100 INJECTION, SOLUTION SUBCUTANEOUS at 21:57

## 2022-02-27 RX ADMIN — HYDROMORPHONE HYDROCHLORIDE 1 MG: 1 INJECTION, SOLUTION INTRAMUSCULAR; INTRAVENOUS; SUBCUTANEOUS at 12:10

## 2022-02-27 RX ADMIN — MORPHINE SULFATE 2 MG: 2 INJECTION, SOLUTION INTRAMUSCULAR; INTRAVENOUS at 22:54

## 2022-02-27 RX ADMIN — HYDROMORPHONE HYDROCHLORIDE 1 MG: 1 INJECTION, SOLUTION INTRAMUSCULAR; INTRAVENOUS; SUBCUTANEOUS at 01:34

## 2022-02-27 ASSESSMENT — PAIN DESCRIPTION - ORIENTATION
ORIENTATION: RIGHT;LEFT

## 2022-02-27 ASSESSMENT — PAIN - FUNCTIONAL ASSESSMENT
PAIN_FUNCTIONAL_ASSESSMENT: PREVENTS OR INTERFERES WITH ALL ACTIVE AND SOME PASSIVE ACTIVITIES
PAIN_FUNCTIONAL_ASSESSMENT: PREVENTS OR INTERFERES WITH ALL ACTIVE AND SOME PASSIVE ACTIVITIES
PAIN_FUNCTIONAL_ASSESSMENT: PREVENTS OR INTERFERES WITH MANY ACTIVE NOT PASSIVE ACTIVITIES
PAIN_FUNCTIONAL_ASSESSMENT: PREVENTS OR INTERFERES WITH ALL ACTIVE AND SOME PASSIVE ACTIVITIES

## 2022-02-27 ASSESSMENT — PAIN DESCRIPTION - ONSET
ONSET: PROGRESSIVE
ONSET: ON-GOING
ONSET: PROGRESSIVE

## 2022-02-27 ASSESSMENT — PAIN DESCRIPTION - PROGRESSION
CLINICAL_PROGRESSION: NOT CHANGED
CLINICAL_PROGRESSION: GRADUALLY IMPROVING
CLINICAL_PROGRESSION: GRADUALLY WORSENING

## 2022-02-27 ASSESSMENT — PAIN DESCRIPTION - LOCATION
LOCATION: LEG

## 2022-02-27 ASSESSMENT — PAIN SCALES - GENERAL
PAINLEVEL_OUTOF10: 7
PAINLEVEL_OUTOF10: 8
PAINLEVEL_OUTOF10: 5
PAINLEVEL_OUTOF10: 5
PAINLEVEL_OUTOF10: 7
PAINLEVEL_OUTOF10: 5
PAINLEVEL_OUTOF10: 7
PAINLEVEL_OUTOF10: 7
PAINLEVEL_OUTOF10: 8
PAINLEVEL_OUTOF10: 7

## 2022-02-27 ASSESSMENT — PAIN DESCRIPTION - PAIN TYPE
TYPE: ACUTE PAIN;SURGICAL PAIN

## 2022-02-27 ASSESSMENT — PAIN DESCRIPTION - DESCRIPTORS
DESCRIPTORS: SHARP
DESCRIPTORS: ACHING;THROBBING
DESCRIPTORS: THROBBING
DESCRIPTORS: ACHING;THROBBING
DESCRIPTORS: THROBBING

## 2022-02-27 ASSESSMENT — PAIN DESCRIPTION - FREQUENCY
FREQUENCY: CONTINUOUS

## 2022-02-27 NOTE — PROGRESS NOTES
[address] Occupational Therapy      Date: 2022  Patient Name: Huma Velazco        MRN: [id number]  Account: [de-identified]   : 1960  (59 y.o.)  Room: [place] [id number]    Chart reviewed, attempted OT at [phone] for OT eval. Patient not seen 2° to:    Hold per nursing request due to: Ruling out DVT RLE- going for US    Spoke to RN, RN aware.  Will attempt again when able- possible in PM    Electronically signed by Devante Encarnacion OT on 2022 at 8:13 AM

## 2022-02-27 NOTE — PROGRESS NOTES
Physical Therapy Missed Treatment   Facility/Department: [facility] MED SURG N229/N229-01    NAME: Kirstie Robert    : 1960 (59 y.o.)  MRN: [id number]    Account: [de-identified]  Gender: male      PT eval attempted at 1300. Pt off floor for US. Will follow and attempt PT evaluation again at earliest availability.        Yesica Allen, PT, 22 at 1:00 PM

## 2022-02-27 NOTE — CARE COORDINATION
[facility] Case Management Initial Discharge Assessment    Met with Patient to discuss discharge plan. PCP: Cris Rausch MD                      Date of Last Visit: \"1 month ago\"    [facility] Patient: Yes        [facility] Notified: yes - Spoke with Afia Lambert. Clinicals and labs thus far faxed to [phone]. If no PCP, list provided? Declined    Discharge Planning    Living Arrangements: independently at home    Who do you live with? Self. Patient states he has been living with his mother briefly since he has been sick. Who helps you with your care:  self or family    If lives at home:     Do you have any barriers navigating in your home? no    Patient can perform ADL? Yes    Current Services (outpatient and in home) : Was active with [facility] up until hospitalization. Plan is to resume [address] once return home. Dialysis: No    Is transportation available to get to your appointments? Yes. [address] for appointments. Has family member or friend take for groceries, etc.    DME Equipment:  yes - [facility]    Respiratory equipment: None    Respiratory provider:  no     [facility]  [facility]    Consult with Medication Assistance Program?  No      Patient agreeable to [address]? Yes, [address]    Patient agreeable to SNF/Rehab? Yes, Company Will decide pending PT/OT evals post-op    Other discharge needs identified? Hospice. Patient desires to be readmitted to [facility] once he goes home. Patient plans to live with mother initially, prior to going back to his home. Initial Discharge Plan? (Note: please see concurrent daily documentation for any updates after initial note).     D/C PLAN HOME W/NLH VS SNF/REHAB    Readmission Risk              Risk of Unplanned Readmission:  0         Electronically signed by Sheela Montes RN on 2/27/2022 at 6:44 AM

## 2022-02-27 NOTE — PROGRESS NOTES
[address] Occupational Therapy      Date: 2022  Patient Name: Cece Yan        MRN: [id number]  Account: [de-identified]   : 1960  (59 y.o.)  Room: [place] [id number]    Chart reviewed, attempted OT at [phone] for OT eval. Patient not seen 2° to:    Pt. off floor for test/procedure: US RLE    Will attempt again when able    Electronically signed by Marcy Tilley OT on 2022 at 12:59 PM

## 2022-02-27 NOTE — PROGRESS NOTES
Physical Therapy Missed Treatment   Facility/Department: [facility] MED SURG N229/N229-01    NAME: Ewelina Arrington    : 1960 (59 y.o.)  MRN: [id number]    Account: [de-identified]  Gender: male    PT referral received. Chart reviewed. RN hold this AM due to pt going for US of MANJINDER MURCIA. Will follow and attempt PT evaluation again at earliest availability.        Yesica Allen, PT, 22 at 8:12 AM

## 2022-02-27 NOTE — FLOWSHEET NOTE
Pt c/o pain in RLE, back of calf and lateral aspect of calf and thigh. Msg sent to Dr. Shanel Cleaning. 2230--Dr. Shanel Cleaning at  assessing pt. Dr. Shirley Goodwin venous duplex scan   2240--Spoke with Dr. Andres Granados, notified of change in pt condition. Rec'd vo for pt to re-start Eliquis this pm.  2245-Dr. Shanel Cleaning notified, rec'd order for Eliquis 5mg po bid to re-start tonight. Order entered into system. Notified pt, he verbalizes understanding and agreement with updates to POC.

## 2022-02-27 NOTE — PROGRESS NOTES
02/27/22  From: Home with Hospice. [facility]. Admit:S/P fall on left leg.  OR 2/26/22 for LLE tib/fib fx     PMH: Stage 4 pancreatic CA (not on treatment), DM 2, BPH, A. Fib (on Eliquis)    Anticipated Discharge Disposition: TBD pending therapy evals    Patient Mobility or PT/OT ordered:Needed  Consults:     Covid result &/or vacc status:     Barriers to Discharge:  Pain control    Assessments:

## 2022-02-27 NOTE — FLOWSHEET NOTE
9585: AM assessment completed. Vital signs obtained and documented. Patient complaining of 7/10 pain in his left leg and 5/10 pain in his right thigh. Medicated with 2mg iv morphine per MAR. Dressing to left leg is C/D/I. Patient able to move toes. Toes are warm to the touch. Patient denies any c/o numbness or tingling to either lower extremities. Noted swelling to patients anterior portion of right foot, and also 2+ pitting edema to the RLL. Ice applied to both right thigh and left leg per patient request.   0930: Patient medicated with oxycodone per [facility] for c/o 7/10 R/L leg pain. 1059 : Dr Eamon Valle rounded and informed of the swelling and pain to patients right leg. New orders received for stat xrays. 1240: Patient down to ultrasound via bed.   1400: Patient returned to floor via bed after xray/ultrasound. Patients mother at bedside.

## 2022-02-28 PROBLEM — S82.401A CLOSED FRACTURE OF RIGHT FIBULA AND TIBIA, INITIAL ENCOUNTER: Status: ACTIVE | Noted: 2022-02-28

## 2022-02-28 PROBLEM — S82.201A CLOSED FRACTURE OF RIGHT FIBULA AND TIBIA, INITIAL ENCOUNTER: Status: ACTIVE | Noted: 2022-02-28

## 2022-02-28 LAB
ANION GAP SERPL CALCULATED.3IONS-SCNC: 7 MEQ/L (ref 9–15)
BUN BLDV-MCNC: 6 MG/DL (ref 8–23)
CALCIUM SERPL-MCNC: 7.7 MG/DL (ref 8.5–9.9)
CHLORIDE BLD-SCNC: 101 MEQ/L (ref 95–107)
CO2: 26 MEQ/L (ref 20–31)
CREAT SERPL-MCNC: 0.47 MG/DL (ref 0.7–1.2)
EKG ATRIAL RATE: 89 BPM
EKG P AXIS: 45 DEGREES
EKG P-R INTERVAL: 146 MS
EKG Q-T INTERVAL: 426 MS
EKG QRS DURATION: 118 MS
EKG QTC CALCULATION (BAZETT): 518 MS
EKG R AXIS: -27 DEGREES
EKG T AXIS: 9 DEGREES
EKG VENTRICULAR RATE: 89 BPM
GFR AFRICAN AMERICAN: >60
GFR NON-AFRICAN AMERICAN: >60
GLUCOSE BLD-MCNC: 144 MG/DL (ref 70–99)
GLUCOSE BLD-MCNC: 156 MG/DL (ref 70–99)
GLUCOSE BLD-MCNC: 158 MG/DL (ref 70–99)
GLUCOSE BLD-MCNC: 159 MG/DL (ref 70–99)
GLUCOSE BLD-MCNC: 222 MG/DL (ref 70–99)
HCT VFR BLD CALC: 24.4 % (ref 42–52)
HEMOGLOBIN: 7.6 G/DL (ref 14–18)
MCH RBC QN AUTO: 26 PG (ref 27–31.3)
MCHC RBC AUTO-ENTMCNC: 31.2 % (ref 33–37)
MCV RBC AUTO: 83.3 FL (ref 80–100)
PDW BLD-RTO: 19.3 % (ref 11.5–14.5)
PERFORMED ON: ABNORMAL
PLATELET # BLD: 126 K/UL (ref 130–400)
POTASSIUM REFLEX MAGNESIUM: 4.3 MEQ/L (ref 3.4–4.9)
RBC # BLD: 2.93 M/UL (ref 4.7–6.1)
SODIUM BLD-SCNC: 134 MEQ/L (ref 135–144)
WBC # BLD: 5.3 K/UL (ref 4.8–10.8)

## 2022-02-28 PROCEDURE — 97163 PT EVAL HIGH COMPLEX 45 MIN: CPT

## 2022-02-28 PROCEDURE — 36415 COLL VENOUS BLD VENIPUNCTURE: CPT

## 2022-02-28 PROCEDURE — 93010 ELECTROCARDIOGRAM REPORT: CPT | Performed by: INTERNAL MEDICINE

## 2022-02-28 PROCEDURE — 6370000000 HC RX 637 (ALT 250 FOR IP): Performed by: ORTHOPAEDIC SURGERY

## 2022-02-28 PROCEDURE — 6370000000 HC RX 637 (ALT 250 FOR IP): Performed by: FAMILY MEDICINE

## 2022-02-28 PROCEDURE — 97167 OT EVAL HIGH COMPLEX 60 MIN: CPT

## 2022-02-28 PROCEDURE — 85027 COMPLETE CBC AUTOMATED: CPT

## 2022-02-28 PROCEDURE — 80048 BASIC METABOLIC PNL TOTAL CA: CPT

## 2022-02-28 PROCEDURE — 2580000003 HC RX 258: Performed by: ORTHOPAEDIC SURGERY

## 2022-02-28 PROCEDURE — 1210000000 HC MED SURG R&B

## 2022-02-28 PROCEDURE — 96375 TX/PRO/DX INJ NEW DRUG ADDON: CPT

## 2022-02-28 PROCEDURE — 6360000002 HC RX W HCPCS: Performed by: INTERNAL MEDICINE

## 2022-02-28 RX ADMIN — APIXABAN 5 MG: 5 TABLET, FILM COATED ORAL at 08:39

## 2022-02-28 RX ADMIN — INSULIN GLARGINE 10 UNITS: 100 INJECTION, SOLUTION SUBCUTANEOUS at 21:07

## 2022-02-28 RX ADMIN — Medication 10 ML: at 21:03

## 2022-02-28 RX ADMIN — OXYCODONE HYDROCHLORIDE 5 MG: 5 TABLET ORAL at 00:39

## 2022-02-28 RX ADMIN — TRIMETHOBENZAMIDE HYDROCHLORIDE 200 MG: 100 INJECTION INTRAMUSCULAR at 02:40

## 2022-02-28 RX ADMIN — OXYCODONE HYDROCHLORIDE 5 MG: 5 TABLET ORAL at 09:54

## 2022-02-28 RX ADMIN — Medication 10 ML: at 08:39

## 2022-02-28 RX ADMIN — OXYCODONE HYDROCHLORIDE 5 MG: 5 TABLET ORAL at 14:40

## 2022-02-28 RX ADMIN — APIXABAN 5 MG: 5 TABLET, FILM COATED ORAL at 21:03

## 2022-02-28 ASSESSMENT — PAIN DESCRIPTION - LOCATION
LOCATION: LEG
LOCATION: LEG
LOCATION: KNEE

## 2022-02-28 ASSESSMENT — PAIN SCALES - GENERAL
PAINLEVEL_OUTOF10: 7
PAINLEVEL_OUTOF10: 5
PAINLEVEL_OUTOF10: 9
PAINLEVEL_OUTOF10: 5
PAINLEVEL_OUTOF10: 5
PAINLEVEL_OUTOF10: 10
PAINLEVEL_OUTOF10: 5
PAINLEVEL_OUTOF10: 3

## 2022-02-28 ASSESSMENT — PAIN DESCRIPTION - ONSET: ONSET: ON-GOING

## 2022-02-28 ASSESSMENT — PAIN DESCRIPTION - DESCRIPTORS
DESCRIPTORS: ACHING
DESCRIPTORS: ACHING

## 2022-02-28 ASSESSMENT — PAIN DESCRIPTION - PAIN TYPE
TYPE: ACUTE PAIN;SURGICAL PAIN
TYPE: SURGICAL PAIN

## 2022-02-28 ASSESSMENT — PAIN DESCRIPTION - ORIENTATION
ORIENTATION: LEFT
ORIENTATION: RIGHT;LEFT
ORIENTATION: LEFT

## 2022-02-28 ASSESSMENT — PAIN DESCRIPTION - FREQUENCY: FREQUENCY: CONTINUOUS

## 2022-02-28 ASSESSMENT — PAIN - FUNCTIONAL ASSESSMENT: PAIN_FUNCTIONAL_ASSESSMENT: PREVENTS OR INTERFERES WITH ALL ACTIVE AND SOME PASSIVE ACTIVITIES

## 2022-02-28 ASSESSMENT — PAIN DESCRIPTION - PROGRESSION: CLINICAL_PROGRESSION: NOT CHANGED

## 2022-02-28 NOTE — PLAN OF CARE
See OT evaluation for all goals and OT POC.  Electronically signed by OSMANI Patel/L on 2/28/2022 at 1:25 PM

## 2022-02-28 NOTE — PROGRESS NOTES
Hospitalist Daily Progress Note  Name: Richmond Hogue  Age: 59 y.o. Gender: male  CodeStatus: Full Code  Allergies: Acetaminophen  Sulfa Antibiotics    Chief Complaint:Fall (pt states lost his balance this morning and fell)      Primary Care Provider: Jacqueline Miller MD    InpatientTreatment Team: Treatment Team: Attending Provider: Ashia Rendon MD; Consulting Physician: Kendra High MD; Utilization Reviewer: Padmini Cabrera RN; Surgeon: Ashia Rendon MD; Registered Nurse: Luis Carlos Ralph RN; Registered Nurse: Anuj Sanchez, RN; Registered Nurse: Frederick Patterson RN    Admission Date: 2/26/2022      Subjective: No chest pain, sob, nausea. Right leg pain. Poorly controlled. Physical Exam  Vitals and nursing note reviewed. Constitutional:       Appearance: Normal appearance. Cardiovascular:      Rate and Rhythm: Normal rate and regular rhythm. Pulmonary:      Effort: Pulmonary effort is normal.      Breath sounds: Normal breath sounds. Abdominal:      General: Bowel sounds are normal.      Palpations: Abdomen is soft. Musculoskeletal:         General: Normal range of motion. Skin:     General: Skin is warm and dry. Neurological:      Mental Status: He is alert and oriented to person, place, and time. Mental status is at baseline.          Medications:  Reviewed    Infusion Medications:    sodium chloride      dextrose      sodium chloride       Scheduled Medications:    sodium chloride flush  5-40 mL IntraVENous 2 times per day    insulin glargine  10 Units SubCUTAneous Nightly    insulin lispro  0-12 Units SubCUTAneous TID WC    insulin lispro  0-6 Units SubCUTAneous Nightly    sodium chloride flush  10 mL IntraVENous 2 times per day    apixaban  5 mg Oral BID     PRN Meds: HYDROmorphone, sodium chloride flush, sodium chloride, ondansetron **OR** ondansetron, polyethylene glycol, glucose, dextrose, glucagon (rDNA), dextrose, sodium chloride flush, sodium chloride, oxyCODONE, morphine, aluminum & magnesium hydroxide-simethicone    Labs:   Recent Labs     02/26/22  0615 02/27/22  0735   WBC 5.3 5.6   HGB 8.7* 7.7*   HCT 28.1* 25.3*   * 112*     Recent Labs     02/26/22  0615 02/27/22  0735   * 135   K 4.0 4.0   CL 97 100   CO2 24 27   BUN 7* 7*   CREATININE 0.52* 0.47*   CALCIUM 7.8* 7.6*     Recent Labs     02/26/22  0615   AST 91*   ALT 35   BILITOT 0.5   ALKPHOS 235*     Recent Labs     02/26/22  0615   INR 1.3     No results for input(s): CKTOTAL, TROPONINI in the last 72 hours. Urinalysis:   Lab Results   Component Value Date    NITRU POSITIVE 01/01/2022    WBCUA 0-2 01/01/2022    BACTERIA Negative 01/01/2022    RBCUA 0-2 01/01/2022    BLOODU Negative 01/01/2022    SPECGRAV 1.014 01/01/2022    GLUCOSEU >=1000 01/01/2022       Radiology:   Most recent    Chest CT      WITH CONTRAST:No results found for this or any previous visit. WITHOUT CONTRAST: No results found for this or any previous visit. CXR      2-view: No results found for this or any previous visit. Portable: Results for orders placed during the hospital encounter of 02/26/22    XR CHEST PORTABLE    Narrative  EXAMINATION: XR CHEST PORTABLE    CLINICAL HISTORY: LEFT TIBIAL FRACTURE. PREOP. COMPARISONS: JANUARY 1, 2022    FINDINGS: Osseous structures are intact. Cardiopericardial silhouette is normal. Pulmonary vasculature is normal. Lungs are clear. Impression  NO ACUTE CARDIOPULMONARY DISEASE. Echo No results found for this or any previous visit.             Assessment/Plan:    Active Hospital Problems    Diagnosis Date Noted    Closed fracture of right fibula and tibia [S82.201A, S82.401A] 02/26/2022     1)  Hx atrial fibrillation on eliquis       Last dose yesterday morning, hold until post-op clearance from orthopedic surgery to resume.    2/27 - holding until ok'd by ortho to restart    2)  Hx DM2       SSI, ac/hs checks after OR     3)  Hx end stage pancreatic cancer on hospice       Flushing pain control with IV diluaudid   2/27 - increased dilaudid today due to poor pain control. Patient had right hip/leg pain, however imaging and doppler u/s were negative. May be 2/2 pancreatic cancer.       4)  BPH       Cont home med after surgery     4)  LLE tibia and fibula fracture       To OR this am.  Patient has a pre-op risk of 1% of major cardiac complication related to surgery based on his history and RCRI scale, placing him in the low to moderate risk category. He has no contraindications to surgery.    2/27 - POD 1, cont pain meds, pt/ot    Electronically signed by Carlos Enrique Perdomo MD on 2/27/2022 at 7:54 PM

## 2022-02-28 NOTE — FLOWSHEET NOTE
Pt c/o nausea, Zofran unverified by pharm d/t prolonged QT interval on most recent EKG. Msg to Dr. Alpesh Palmer, order re'd for Tigan IM injection for nausea. Pt states nausea has now resolved and declined med at this time but is aware it is available to him if needed.

## 2022-02-28 NOTE — PROGRESS NOTES
Hospitalist Progress Note      Date of Admission: 2/26/2022  Chief Complaint:    Chief Complaint   Patient presents with    Fall     pt states lost his balance this morning and fell     Subjective:  No new complaints.   No nausea, vomiting, chest pain, or headache      Medications:    Infusion Medications    sodium chloride      dextrose      sodium chloride       Scheduled Medications    sodium chloride flush  5-40 mL IntraVENous 2 times per day    insulin glargine  10 Units SubCUTAneous Nightly    insulin lispro  0-12 Units SubCUTAneous TID WC    insulin lispro  0-6 Units SubCUTAneous Nightly    sodium chloride flush  10 mL IntraVENous 2 times per day    apixaban  5 mg Oral BID     PRN Meds: trimethobenzamide, HYDROmorphone, sodium chloride flush, sodium chloride, ondansetron **OR** ondansetron, polyethylene glycol, glucose, dextrose, glucagon (rDNA), dextrose, sodium chloride flush, sodium chloride, oxyCODONE, morphine, aluminum & magnesium hydroxide-simethicone    Intake/Output Summary (Last 24 hours) at 2/28/2022 1421  Last data filed at 2/28/2022 1201  Gross per 24 hour   Intake 360 ml   Output 1200 ml   Net -840 ml     Exam:  /70   Pulse 83   Temp 99.1 °F (37.3 °C) (Oral)   Resp 16   Ht 5' 11\" (1.803 m)   Wt 200 lb (90.7 kg)   SpO2 96%   BMI 27.89 kg/m²   Head: Normocephalic, atraumatic  Sclera clear  Neck JVD flat  Lungs: normal effort of breathing    Labs:   Recent Labs     02/26/22  0615 02/27/22  0735 02/28/22  1009   WBC 5.3 5.6 5.3   HGB 8.7* 7.7* 7.6*   HCT 28.1* 25.3* 24.4*   * 112* 126*     Recent Labs     02/26/22  0615 02/27/22  0735 02/28/22  1009   * 135 134*   K 4.0 4.0 4.3   CL 97 100 101   CO2 24 27 26   BUN 7* 7* 6*   CREATININE 0.52* 0.47* 0.47*   CALCIUM 7.8* 7.6* 7.7*   AST 91*  --   --    ALT 35  --   --    BILITOT 0.5  --   --    ALKPHOS 235*  --   --      Recent Labs     02/26/22  0615   INR 1.3     No results for input(s): Malika Ends in the last 72 hours. Radiology:  XR FEMUR RIGHT (MIN 2 VIEWS)   Final Result   NO FRACTURE. XR TIBIA FIBULA RIGHT (2 VIEWS)   Final Result   NEGATIVE RIGHT TIBIA AND FIBULA. US DUP LOWER EXTREMITY RIGHT ROXY   Final Result   NO SONOGRAPHIC EVIDENCE, DEEP VEIN THROMBOSIS RIGHT LOWER EXTREMITY, WITH ABOVE LIMITATIONS. FLUORO FOR SURGICAL PROCEDURES   Final Result   INTRAOPERATIVE FLUOROSCOPY AS DISCUSSED. XR CHEST PORTABLE   Final Result   NO ACUTE CARDIOPULMONARY DISEASE.      XR TIBIA FIBULA LEFT (2 VIEWS)   Final Result      Comminuted fractures of the proximal fibula and distal tibia as described. Assessment/Plan:  Fib/tib fx -being primarily managed by orthopedic surgery    Hx of afib: on eliquis, rate controlled. Pancreatic cancer with hospice. Diabetes: Monitor glucose accordion, titrate medications as needed    Disposition: Skilled nursing facility.     35 minutes total care time, >1/2 in unit/floor time and care coordination     Patricia Cortez MD ,MD

## 2022-02-28 NOTE — PROGRESS NOTES
[facility] OCCUPATIONAL THERAPY EVALUATION - ACUTE     NAME: Saud Carbajal  : 1960 (59 y.o.)  MRN: [id number]  CODE STATUS: Full Code  Room: Q167E983-26    Date of Service: 2022    Patient Diagnosis(es): Closed fracture of left tibia and fibula, initial encounter [S82.202A, S82.402A]  Closed fracture of right tibia and fibula, initial encounter [S82.201A, S82.401A]  Closed fracture of right fibula and tibia, initial encounter Bassam Ibrahim, S82.401A]   Chief Complaint   Patient presents with   Juli Beltrán     pt states lost his balance this morning and fell     Patient Active Problem List    Diagnosis Date Noted    Closed fracture of right fibula and tibia, initial encounter 2022    Closed fracture of right fibula and tibia 2022    Thrombocytopenia ([address]         Past Medical History:   Diagnosis Date    Atrial fibrillation ([address]     Diabetes mellitus ([address]      Past Surgical History:   Procedure Laterality Date    APPENDECTOMY      HERNIA REPAIR      TIBIA FRACTURE SURGERY Left 2022    LEFT TIBIA NAIL INSERTION performed by Federico Carpio MD at [facility]        Restrictions  Restrictions/Precautions: Fall Risk,Weight Bearing (high rock score)  Lower Extremity Weight Bearing Restrictions  Partial Weight Bearing Percentage Or Pounds: touch down weight bearing     Safety Devices: Safety Devices  Safety Devices in place: Yes  Type of devices:  All fall risk precautions in place        Subjective  Pre Treatment Pain Screening  Pain at present: 9  Scale Used: Numeric Score  Intervention List: Nurse/Physician notified,Patient able to continue with treatment    Pain Reassessment:   Pain Assessment  Patient Currently in Pain: Yes (no pain at rest; screaming out in pain with all passive/ active movement of L LE)  Pain Assessment: 0-10  Pain Level: 9  Pain Location: Knee  Pain Orientation: Left  Pain Descriptors: Aching       Prior Level of Function:  Social/Functional History  Lives With: Family (living with his mother; brother and nephew can help- per pt report)  Type of Home: House  Home Layout: Two level,Able to Live on Main level with bedroom/bathroom  Home Access: Stairs to enter without rails  Entrance Stairs - Number of Steps: 1  Bathroom Shower/Tub: Walk-in shower  Bathroom Equipment: Shower chair  Home Equipment: 4 wheeled walker,Hospital bed  ADL Assistance: Needs assistance (indep with toileting; assist with showers and dressing)  Homemaking Assistance: Needs assistance  Homemaking Responsibilities: No  Ambulation Assistance: Independent (uses rollator)  Transfer Assistance: Independent  Active : No  Additional Comments[facility]; hospice comes to pt's home every other day and stays for 4 hrs and assists with showers and dressing and some laundry; pt's mother assists with groceries, driving to grocery store, meals, and laundry    OBJECTIVE:     Orientation Status:  Orientation  Overall Orientation Status: Within Functional Limits    Observation:  Observation/Palpation  Posture: Poor  Observation: dressing intact L LE; pt agreeable to therapy eval; however, screaming out in pain with all active/ passive movement L LE. Word finding difficulties during conversation  Edema: R lower leg and foot    Cognition Status:  Cognition  Overall Cognitive Status: Exceptions  Arousal/Alertness: Appropriate responses to stimuli  Following Commands:  Follows one step commands consistently,Follows multistep commands with increased time  Attention Span: Difficulty dividing attention,Difficulty attending to directions  Memory: Decreased recall of recent events,Decreased recall of precautions  Safety Judgement: Decreased awareness of need for assistance,Decreased awareness of need for safety  Problem Solving: Assistance required to identify errors made,Assistance required to correct errors made,Assistance required to implement solutions,Assistance required to generate Independent = Pt. is able to perform task with no assistance but may require a device   Stand by assistance = Pt. does not perform task at an independent level but does not need physical assistance, requires verbal cues  Minimal, Moderate, Maximal Assistance = Pt. requires physical assistance (25%, 50%, 75% assist from helper) for task but is able to actively participate in task   Dependent = Pt. requires total assistance with task and is not able to actively participate with task completion     Functional Mobility:  Functional Mobility  Functional Mobility Comments: Unsafe to attempt  Transfers  Sit to stand: Unable to assess  Stand to sit: Unable to assess  Transfer Comments: Unsafe to attempt    Bed Mobility  Bed mobility  Supine to Sit: Dependent/Total,2 Person assistance  Sit to Supine: Dependent/Total,2 Person assistance  Comment: Pt initially requires max A for upright posture at EOB. Pt requires assist to avoid posterior lean. Pt. was able to correct to SBA for seated balance; complained of dizziness, nausea and increase LLE pain when at EOB    Seated and Standing Balance:  Balance  Sitting Balance:  Moderate assistance (Varies from mod A to SBA throughout)  Standing Balance: Unable to assess(comment) (Unsafe to attempt this AM)    Functional Endurance:  Activity Tolerance  Activity Tolerance: Patient limited by pain,Treatment limited secondary to medical complications (free text)  Activity Tolerance: Limited d/t nausea and dizziness    D/C Recommendations:  OT D/C RECOMMENDATIONS  REQUIRES OT FOLLOW UP: Yes    Equipment Recommendations:  OT Equipment Recommendations  Other: Continue to assess    OT Education:   OT Education  OT Education: [facility]  Patient Education: Educated on role of acute care OT for mobility  Barriers to Learning: Pain    OT Follow Up:  OT D/C RECOMMENDATIONS  REQUIRES OT FOLLOW UP: Yes     Assessment/Discharge Disposition:  Assessment: Pt is a 59year old man from home who presents to [address] [facility] with the above deficits which impact his ability to perform ADLs and IADLs. Pt. limited d/t pain and dizziness with initiation of mobility.   Performance deficits / Impairments: Decreased functional mobility ,Decreased ADL status,Decreased strength,Decreased endurance,Decreased high-level IADLs,Decreased fine motor control,Decreased coordination,Decreased balance,Decreased safe awareness,Decreased cognition,Decreased posture  Prognosis: Good  Discharge Recommendations: Continue to assess pending progress  Decision Making: High Complexity  History: Pt's medical history is complex  Exam: Pt. has 11 performance deficits  Assistance / Modification: Pt. requires max/total A    Six Click Score   How much help for putting on and taking off regular lower body clothing?: Total  How much help for Bathing?: A Lot  How much help for Toileting?: Total  How much help for putting on and taking off regular upper body clothing?: A Little  How much help for taking care of personal grooming?: A Little  How much help for eating meals?: A Little  AM-[facility] Inpatient Daily Activity Raw Score: 13  AM-[facility] Inpatient ADL T-Scale Score : 32.03  ADL Inpatient CMS 0-100% Score: 63.03    Plan:  Plan  Times per week: 1-4x  Plan weeks: Length of acute stay  Current Treatment Recommendations: Strengthening,Balance Training,Functional Mobility Training,Endurance Training,Pain Management,Safety Education & Training,Patient/Caregiver Education & Training,Equipment Evaluation, Education, & procurement,Home Management Training,Cognitive/Perceptual Training,Self-Care / ADL    Goals:   Patient will:    - Improve functional endurance to tolerate/complete 30 mins of ADL's  - Be Setup in UB ADLs   - Be Mod A in LB ADLs  - Be Mod A in ADL transfers without LOB  - Be Mod A in toileting tasks  - Improve B hand fine motor coordination to [facility] in order to manage clothing fasteners/self-care containers in a timely manner  - Improve B UE strength and endurance to 4/5 in order to participate in self-care activities as projected. - Access appropriate D/C site with as few architectural barriers as possible. - Sequence self-care tasks with no verbal cues for weight bearing restrictions    Patient Goal: Patient goals : \"I want to get home\"      Discussed and agreed upon: Yes Comments:     Therapy Time:   OT Individual Minutes  Time In: 0840  Time Out: 0900  Minutes: 20    Eval: 20 minutes     Electronically signed by:     Xochitl Acuna OTR/L, OTR/L  2/28/2022, 1:23 PM

## 2022-02-28 NOTE — PROGRESS NOTES
Physical Therapy Med Surg Initial Assessment  [facility]  Room: N229/N229-01       NAME: Cece Yan  : 1960 (59 y.o.)  MRN: [id number]  CODE STATUS: Full Code    Date of Service: 2022    Patient Diagnosis(es): Closed fracture of left tibia and fibula, initial encounter [S82.202A, S82.402A]  Closed fracture of right tibia and fibula, initial encounter [S82.201A, S82.401A]  Closed fracture of right fibula and tibia, initial encounter Yarely Sue, S82.401A]   Chief Complaint   Patient presents with   Jerelene Dy     pt states lost his balance this morning and fell     Patient Active Problem List    Diagnosis Date Noted    Closed fracture of right fibula and tibia, initial encounter 2022    Closed fracture of right fibula and tibia 2022    Thrombocytopenia ([address]         Past Medical History:   Diagnosis Date    [facility]     Diabetes mellitus ([facility]      Past Surgical History:   Procedure Laterality Date    APPENDECTOMY      HERNIA REPAIR         Chart Reviewed: Yes  Patient assessed for rehabilitation services?: Yes  Family / Caregiver Present: No    Restrictions:  Restrictions/Precautions: Fall Risk,Weight Bearing (high rock score)  Lower Extremity Weight Bearing Restrictions  Partial Weight Bearing Percentage Or Pounds: touch down weight bearing     SUBJECTIVE:      Pain  Pre Treatment Pain Screening  Pain at present: 0  Intervention List: Patient able to continue with treatment;Nurse/physician notified    Post Treatment Pain Screening:   Pain Screening  Patient Currently in Pain: Yes (no pain at rest; screaming out in pain with all passive/ active movement of L LE)  Pain Assessment  Pain Level: 10  Pain Location: Leg  Pain Orientation: Left    Prior Level of Function:  Social/Functional History  Lives With: Family (living with his mother; brother and nephew can help- per pt report)  Type of Home: House  Home Layout: Two level,Able to Live on Main level with bedroom/bathroom  Home Access: Stairs to enter without rails  Entrance Stairs - Number of Steps: 1  Bathroom Shower/Tub: Walk-in shower  Bathroom Equipment: Shower chair  Home Equipment: 4 wheeled walker,Hospital bed  ADL Assistance: Needs assistance (indep with toileting; assist with showers and dressing)  Homemaking Assistance: Needs assistance  Homemaking Responsibilities: No  Ambulation Assistance: Independent (uses rollator)  Transfer Assistance: Independent  Active : No  Additional Comments: [facility]; hospice comes to pt's home every other day and stays for 4 hrs and assists with showers and dressing and some laundry; pt's mother assists with groceries, driving to grocery store, meals, and laundry    OBJECTIVE:   Vision: Impaired  Vision Exceptions: Wears glasses at all times  Hearing: Within functional limits    Cognition:  Overall Orientation Status: Within Functional Limits  Follows Commands: Within Functional Limits    Observation/Palpation  Posture: Poor  Observation: dressing intact L LE; pt agreeable to therapy eval; however, screaming out in pain with all active/ passive movement L LE; evidence of decreased memory and intermittent word finding difficulties  Edema: R lower leg and foot    ROM:  RLE AROM: WFL  LLE PROM: Exceptions  LLE General PROM: Hip WFL; knee/ ankle not assess due to severe pain    Strength:  Strength RLE  Comment: grossly 2/5  Strength LLE  Comment: grossly 2-/5 due to severe pain  Strength Other  Other: core 3/5    Neuro:  Balance  Sitting - Static: Poor; - (poor minus initially due to posterior lean; improved to good minus with SBA after several minutes)  Sitting - Dynamic:  (unsafe to assess)  Standing - Static:  (unsafe to assess)  Standing - Dynamic:  (unsafe to assess)     Motor Control  Gross Motor?: WFL  Sensation  Overall Sensation Status: WFL    Bed mobility  Supine to Sit: Dependent/Total;2 Person assistance  Sit to Supine: Dependent/Total;2 Person assistance  Comment: initially pt requiring max A to maintain upright seated posture edge of bed and avoid posterior lean; pt able to correct to SBA for seated balance after several minutes; pt c/o dizziness/ nausea/ increased L LE pain seated edge of bed    Transfers  Comment: unsafe to assess due to dizziness/ nausea sitting edge of bed; anticipate pt having difficulty demonstrating touch down weight bearing L LE    Ambulation  Ambulation?: No (unsafe to assess due to dizziness/ nausea sitting edge of bed)              Activity Tolerance  Activity Tolerance: Patient limited by pain  Activity Tolerance: Pt limited by new weight bearing status L LE and strength and balance deficits          PT Education  PT Education: Goals;Plan of Care;PT Role;General Safety    ASSESSMENT:   Body structures, Functions, Activity limitations: Decreased functional mobility ; Decreased safe awareness;Decreased balance;Decreased cognition;Decreased ROM; Decreased endurance; Increased pain;Decreased strength  Decision Making: High Complexity  History: high  Exam: high  Clinical Presentation: high    Prognosis: Fair    DISCHARGE RECOMMENDATIONS:  Discharge Recommendations: Continue to assess pending progress    Assessment: Pt is 59 y.o. male s/p fall and underwent left tibia nail insertion. Pt previously on hospice at home. Pt exhibits decreased ROM, strength, balance, cognition, and increased pain. Continued PT is indicated for d/c at highest level of indep.   REQUIRES PT FOLLOW UP: Yes      PLAN OF CARE:  Plan  Times per week: 5-7  Times per day:  (1-2 visits per day)  Current Treatment Recommendations: Strengthening,Functional Mobility Training,Wheelchair Mobility Training,Neuromuscular Re-education,Home Exercise Program,Equipment Evaluation, Education, & procurement,ROM,Transfer Training,Gait Training,Cognitive Reorientation,Safety Education & Lorelie Budge Training,Stair training,Pain Management,Patient/Caregiver Education & Training,Positioning  Safety Devices  Type of devices: Bed alarm in place,Call light within reach,Left in bed    Goals:  Short term goals  Short term goal 1: Pt will demonstrate HEP with SBA  Long term goals  Long term goal 1: Pt will demonstrate bed mobility CGA  Long term goal 2: Pt will demonstrate transfers mod A with safest AD and demonstraing touch down weight bearing L % of the time  Long term goal 3: Pt will demonstrate amb max A with safest AD >/= 10ft  and demonstraing touch down weight bearing L % of the time  Long term goal 4: Pt will demonstrate w/c mobility indep >/=100ft with B UEs    AMPAC (6 CLICK) BASIC MOBILITY  AM-PAC Inpatient Mobility Raw Score : 6    Therapy Time:   Individual   Time In 0840   Time Out 0900   Minutes 21           Yesica Allen, PT, 02/28/22 at 9:53 AM         Definitions for assistance levels  Independent = pt does not require any physical supervision or assistance from another person for activity completion. Device may be needed.   Stand by assistance = pt requires verbal cues or instructions from another person, close to but not touching, to perform the activity  Minimal assistance= pt performs 75% or more of the activity; assistance is required to complete the activity  Moderate assistance= pt performs 50% of the activity; assistance is required to complete the activity  Maximal assistance = pt performs 25% of the activity; assistance is required to complete the activity  Dependent = pt requires total physical assistance to accomplish the task

## 2022-02-28 NOTE — PROGRESS NOTES
[facility] is scheduled to meet with Nichol Reyes today at  for referral meeting. Nichol Reyes stated she would like for patient to go to [facility] with [facility] services.

## 2022-02-28 NOTE — PROGRESS NOTES
Spiritual Care Services     Summary of Visit:  Initial visit. Patient was resting quietly in bed. He is very matter of fact when he speaks of his health situation. He was diagnosed with pancreatic cancer in October. Patient expressed that he is not Sabianist though he was raised Faith. [facility] [employer] [place].    Patient expressed that he has AD through the [facility]. His sister Carissa Mcnair is his primary medical decision maker. No documents found in his chart or epic. He does, however, have a DNR-CC document dated 01/22 in [address] [place] [address]. He is, at this time, listed as a full code. I do note that his POA has a hospice meeting scheduled for today. Patient expressed that he has come to terms with his cancer diagnosis and is able to speak freely about it. Spiritual Assessment/Intervention/Outcomes:    Encounter Summary  Services provided to[de-identified] Patient  Referral/Consult From[de-identified] Rounding  Support System: Parent,Family members  Place of Congregation: None  Continue Visiting: Yes  Complexity of Encounter: Moderate  Length of Encounter: 15 minutes  Spiritual Assessment Completed: Yes  Advance Care Planning: Yes  Routine  Type: Initial  Assessment: Calm,Approachable,Passive,Coping  Intervention: Active listening,Explored feelings, thoughts, concerns,Sustaining presence/ Ministry of presence,Discussed relationship with God,Discussed belief system/Sabianist practices/stephanie,Discussed illness/injury and it's impact  Outcome: Acceptance,Expressed gratitude,Engaged in conversation,Receptive,Coping              Primary Decision Maker (Healthcare Proxy)  Patient's Healthcare Decision Maker is[de-identified] Legal Next of Kin (Also, DNR-CC in Epic)           Values / Beliefs  Do you have any ethnic, cultural, sacramental, or spiritual Sabianist needs you would like us to be aware of while you are in the hospital?: No    Care Plan:    Follow up to continue to provide support and probe his spiritual roots.     Spiritual Care Services Electronically signed by Naheed Hess on 2/28/22 at 11:19 AM EST     To reach a  for emotional and spiritual support, place an [facility] consult request.   If a  is needed immediately, dial 0 and ask to page the on-call .

## 2022-02-28 NOTE — PROGRESS NOTES
Starla Forman   1960   [id number]   Patient Selection Criteria for Determining [facility] Admission Status      Inclusion Criteria for OP DOS D/C (all must be met)        [] Primary TKR [] ASA score <3   [] Support at home DOS post d/c thru POD #2 (competent, capable support) [] Surgery can be completed prior to noon, patient lives < 1 hr from facility   [] Age <75 [] Has no exclusion factors (see below)       Exclusion Criteria from discharging on the day of surgery (check any that apply)        NEUROLOGICAL/PSYCH ENDOCRINE       [] Neurological disorders (hx recent - last 3 months - CVA/TIA, Parkinson's, MS,  [] A1c > 7.5        Anticonvulsant therapy)     [] Communication deficits - Illiteracy, non-English speaking, special communication needs MUSCULOSKELETAL/MOBILITY   [] Cognitive impairment (dementia, Alzheimer's post-op delirium, intellectual disability)    [] Psychiatric hx (schizophrenia, bipolar, suicidal ideation/attempts, on antipsychotic, mood  [] Revision, removal of hardware, art abnormality making case \"not typical\"        stabilizers, stimulants) [x] Mobility limitations (recent surgery, amputation, wheelchair bound, use of orthotics   [] Chronic pain - uncontrolled and/or chronic opioid or preoperative opioid use [] Acute or chronic pain limiting mobility in non-operative LE, other (specify)______________   [] Alcohol abuse or other substance abuse hx [] Falls within the last 3 months       CARDIOVASCULAR HEMATOLOGY       [x] CHF, AMI, Valve replacement, etc. To include rhythm disturbances such as atrial fib [] History of bleeding disorder    [] VTE history   PULMONARY     MEDICATIONS   [] EDD, STOP-BANG >4, COPD, asthma requiring medications routinely, Chronic          bronchitis or PNA within last 3 months, or O2 sats <92 preop, home O2 use [x] On anticoagulant therapy (Coumadin, Eliquis, Xarelto, Pradaxa, Plavix, Brilinta, Lovenox,    [] Smoking status - active, daily use etc)    [] On steroids, biologics, immunosuppression   GI/     OTHER   [] Hx of liver dysfunction (cirrhosis, currently elevated LFTs, Hep C, etc)    [] Hx POUR or other urological hx causing retention, use of urologic meds such as used to  [x] ASA>2        treat overactive bladder [] Treated for an active infection in the last ____ months or _____ weeks   [] Hx of PONV/vertigo/motion sickness with N/V sx []BMI>35   [] Impaired renal function (Creatinine > 1.5) CKD, RIMA with past surgery, any dialysis hx []Any poorly controlled comorbid conditions other than those listed here   [] Hx gastric bypass, ileus, GI bleeding (specify)________________________________________

## 2022-03-01 LAB
GLUCOSE BLD-MCNC: 154 MG/DL (ref 70–99)
GLUCOSE BLD-MCNC: 160 MG/DL (ref 70–99)
GLUCOSE BLD-MCNC: 170 MG/DL (ref 70–99)
GLUCOSE BLD-MCNC: 185 MG/DL (ref 70–99)
PERFORMED ON: ABNORMAL

## 2022-03-01 PROCEDURE — 2580000003 HC RX 258: Performed by: ORTHOPAEDIC SURGERY

## 2022-03-01 PROCEDURE — 6360000002 HC RX W HCPCS: Performed by: FAMILY MEDICINE

## 2022-03-01 PROCEDURE — 6370000000 HC RX 637 (ALT 250 FOR IP): Performed by: INTERNAL MEDICINE

## 2022-03-01 PROCEDURE — 6360000002 HC RX W HCPCS: Performed by: ORTHOPAEDIC SURGERY

## 2022-03-01 PROCEDURE — 6370000000 HC RX 637 (ALT 250 FOR IP): Performed by: FAMILY MEDICINE

## 2022-03-01 PROCEDURE — 6370000000 HC RX 637 (ALT 250 FOR IP): Performed by: ORTHOPAEDIC SURGERY

## 2022-03-01 PROCEDURE — 97535 SELF CARE MNGMENT TRAINING: CPT

## 2022-03-01 PROCEDURE — 1210000000 HC MED SURG R&B

## 2022-03-01 RX ORDER — LACTULOSE 10 G/15ML
20 SOLUTION ORAL 3 TIMES DAILY
Status: COMPLETED | OUTPATIENT
Start: 2022-03-01 | End: 2022-03-02

## 2022-03-01 RX ADMIN — OXYCODONE HYDROCHLORIDE 5 MG: 5 TABLET ORAL at 08:48

## 2022-03-01 RX ADMIN — Medication 10 ML: at 08:44

## 2022-03-01 RX ADMIN — LACTULOSE 20 G: 20 SOLUTION ORAL at 19:39

## 2022-03-01 RX ADMIN — Medication 10 ML: at 19:41

## 2022-03-01 RX ADMIN — MORPHINE SULFATE 2 MG: 2 INJECTION, SOLUTION INTRAMUSCULAR; INTRAVENOUS at 19:40

## 2022-03-01 RX ADMIN — OXYCODONE HYDROCHLORIDE 5 MG: 5 TABLET ORAL at 13:58

## 2022-03-01 RX ADMIN — APIXABAN 5 MG: 5 TABLET, FILM COATED ORAL at 08:44

## 2022-03-01 RX ADMIN — HYDROMORPHONE HYDROCHLORIDE 2 MG: 1 INJECTION, SOLUTION INTRAMUSCULAR; INTRAVENOUS; SUBCUTANEOUS at 22:58

## 2022-03-01 RX ADMIN — Medication 10 ML: at 20:05

## 2022-03-01 RX ADMIN — APIXABAN 5 MG: 5 TABLET, FILM COATED ORAL at 19:41

## 2022-03-01 RX ADMIN — POLYETHYLENE GLYCOL 3350 17 G: 17 POWDER, FOR SOLUTION ORAL at 03:06

## 2022-03-01 RX ADMIN — LACTULOSE 20 G: 20 SOLUTION ORAL at 12:43

## 2022-03-01 RX ADMIN — INSULIN GLARGINE 10 UNITS: 100 INJECTION, SOLUTION SUBCUTANEOUS at 19:40

## 2022-03-01 ASSESSMENT — PAIN DESCRIPTION - PROGRESSION
CLINICAL_PROGRESSION: NOT CHANGED

## 2022-03-01 ASSESSMENT — PAIN SCALES - GENERAL
PAINLEVEL_OUTOF10: 2
PAINLEVEL_OUTOF10: 5
PAINLEVEL_OUTOF10: 7
PAINLEVEL_OUTOF10: 6
PAINLEVEL_OUTOF10: 5
PAINLEVEL_OUTOF10: 5
PAINLEVEL_OUTOF10: 7
PAINLEVEL_OUTOF10: 7

## 2022-03-01 ASSESSMENT — PAIN DESCRIPTION - ONSET
ONSET: ON-GOING

## 2022-03-01 ASSESSMENT — PAIN DESCRIPTION - FREQUENCY
FREQUENCY: CONTINUOUS

## 2022-03-01 ASSESSMENT — PAIN DESCRIPTION - LOCATION
LOCATION: LEG

## 2022-03-01 ASSESSMENT — PAIN DESCRIPTION - DESCRIPTORS
DESCRIPTORS: ACHING;SPASM
DESCRIPTORS: ACHING
DESCRIPTORS: ACHING;SORE
DESCRIPTORS: ACHING
DESCRIPTORS: ACHING

## 2022-03-01 ASSESSMENT — PAIN DESCRIPTION - ORIENTATION
ORIENTATION: LEFT

## 2022-03-01 ASSESSMENT — PAIN DESCRIPTION - PAIN TYPE
TYPE: SURGICAL PAIN;ACUTE PAIN
TYPE: SURGICAL PAIN

## 2022-03-01 ASSESSMENT — PAIN - FUNCTIONAL ASSESSMENT
PAIN_FUNCTIONAL_ASSESSMENT: PREVENTS OR INTERFERES SOME ACTIVE ACTIVITIES AND ADLS

## 2022-03-01 ASSESSMENT — PAIN DESCRIPTION - DIRECTION
RADIATING_TOWARDS: LEFT

## 2022-03-01 NOTE — FLOWSHEET NOTE
PRN glycolax given per patient request. Patient has not had a BM in a few days but is passing flatus.

## 2022-03-01 NOTE — DISCHARGE INSTR - COC
Continuity of Care Form    Patient Name: Kirstie Robert   :  1960  MRN:  [id number]    Admit date:  2022  Discharge date:  3/3/22    Code Status Order: Full Code   Advance Directives:   [facility] Documentation       Date/Time Healthcare Directive Type of Healthcare Directive Copy in [facility] [address] Agent's Name Healthcare Agent's Phone Number    22 [phone] No, patient does not have an advance directive for healthcare treatment -- -- -- -- --            Admitting Physician:  Stacia Roca MD  PCP: Wong Crow MD    Discharging Nurse: LAURA OLSEN Unit/Room#: N56/N26-65  Discharging Unit Phone Number: [phone]    Emergency Contact:   Extended Emergency Contact Information  Primary Emergency Contact: Guerline Forman  Home Phone: [phone]  Relation: Parent  Secondary Emergency Contact: [address] [place] [address] Phone: [phone]  Relation: Brother/Sister    Past Surgical History:  Past Surgical History:   Procedure Laterality Date    APPENDECTOMY      HERNIA REPAIR      TIBIA FRACTURE SURGERY Left 2022    LEFT TIBIA NAIL INSERTION performed by Stacia Roca MD at [facility]       Immunization History: There is no immunization history on file for this patient.     Active Problems:  Patient Active Problem List   Diagnosis Code    Thrombocytopenia ([facility] [address] D69.6    Closed fracture of right fibula and tibia S82.201A, S82.401A    Closed fracture of right fibula and tibia, initial encounter S82.201A, S82.401A       Isolation/Infection:   Isolation            No Isolation          Patient Infection Status       Infection Onset Added Last Indicated Last Indicated By Review Planned Expiration Resolved Resolved By    None active    Resolved    COVID-19 (Rule Out) 22 COVID-19, Rapid (Ordered)   22 Rule-Out Test Resulted            Nurse Assessment:  Last Vital Signs: BP (!) 142/71   Pulse 73   Temp 98.4 °F (36.9 °C) (Oral)   Resp 16   Ht 5' 11\" (1.803 m)   Wt 200 lb (90.7 kg)   SpO2 98%   BMI 27.89 kg/m²     Last documented pain score (0-10 scale): Pain Level: 2  Last Weight:   Wt Readings from Last 1 Encounters:   02/26/22 200 lb (90.7 kg)     Mental Status:  oriented    IV Access:  - None    Nursing Mobility/ADLs:  Walking   Assisted  Transfer  Assisted  Bathing  Assisted  Dressing  Assisted  Toileting  Assisted  Feeding  Independent  Med Admin  Independent  Med Delivery   whole    Wound Care Documentation and Therapy:        Elimination:  Continence: Bowel: Yes  Bladder: Yes  Urinary Catheter: None   Colostomy/Ileostomy/Ileal Conduit: No       Date of Last Bm:3/3/22    Intake/Output Summary (Last 24 hours) at 3/1/2022 1013  Last data filed at 3/1/2022 0851  Gross per 24 hour   Intake 360 ml   Output 1700 ml   Net -1340 ml     I/O last 3 completed shifts: In: 360 [P.O.:360]  Out: 2400 [Urine:2400]    Safety Concerns: At Risk for Falls    Impairments/Disabilities:      None    Nutrition Therapy:  Current Nutrition Therapy:   - Oral Diet:  General    Routes of Feeding: Oral  Liquids: Thin Liquids  Daily Fluid Restriction: no  Last Modified Barium Swallow with Video (Video Swallowing Test): not done    Treatments at the Time of Hospital Discharge:   Respiratory Treatments: NA  Oxygen Therapy:  is not on home oxygen therapy. Ventilator:    - No ventilator support    Rehab Therapies: Physical Therapy and Occupational Therapy  Weight Bearing Status/Restrictions: Touchdown weight bearing (10-25 lbs) only on left leg  Other Medical Equipment (for information only, NOT a DME order):  walker  Other Treatments: NA    Patient's personal belongings (please select all that are sent with patient):   All personal belongings sent with pt    RN SIGNATURE:  Electronically signed by Ross Abdi RN on 3/3/22 at 5:32 PM EST    CASE MANAGEMENT/SOCIAL WORK SECTION    Inpatient Status Date: 2/28/22    Readmission Risk Assessment Score:  Readmission Risk              Risk of Unplanned Readmission:  19           Discharging to Facility/ Agency   Name: Mary Zamora  Address:  Phone:  Fax:    Dialysis Facility (if applicable)   Name:  Address:  Dialysis Schedule:  Phone:  Fax:    / signature: Electronically signed by LUCIAN Lindsay on 3/1/22 at 10:13 AM EST    PHYSICIAN SECTION    Prognosis: Good    Condition at Discharge: Stable    Rehab Potential (if transferring to Rehab): Good    Recommended Labs or Other Treatments After Discharge: Toe touch weight bearing to operative extremity   Surgical dressing to remain in place until one week follow up with orthopedic surgeon  Eliquis for dvt prophylaxis    Physician Certification: I certify the above information and transfer of Fredo Burgess  is necessary for the continuing treatment of the diagnosis listed and that he requires [facility] for less 30 days.      Update Admission H&P: No change in H&P    PHYSICIAN SIGNATURE:  Electronically signed by Joselito Rubio MD on 3/2/22 at 10:19 AM EST

## 2022-03-01 NOTE — CARE COORDINATION
GUILLERMOW spoke with Tanvir Pearce to see if [facility] is going to have a bed for him with [facility] to follow and [facility] to provide coverage for room and board. She will get back with us regarding bed. GUILLERMOW spoke with the pt to discuss a back up plan and he chose [facility] [address] as back up plan. LUCIAN spoke with Earl Shelby at [facility] and she sent me the [facility] paperwork to complete and that was completed and sent back to her. [facility] will pay for room and board charges if the pt is hospice care. [facility] will not have a bed for the pt. Referral sent to [phone]

## 2022-03-01 NOTE — PROGRESS NOTES
Hospitalist Progress Note      Date of Admission: 2/26/2022  Chief Complaint:    Chief Complaint   Patient presents with    Fall     pt states lost his balance this morning and fell     Subjective:  No new complaints. No nausea, vomiting, chest pain, or headache      Medications:    Infusion Medications    sodium chloride      dextrose      sodium chloride       Scheduled Medications    lactulose  20 g Oral TID    sodium chloride flush  5-40 mL IntraVENous 2 times per day    insulin glargine  10 Units SubCUTAneous Nightly    insulin lispro  0-12 Units SubCUTAneous TID WC    insulin lispro  0-6 Units SubCUTAneous Nightly    sodium chloride flush  10 mL IntraVENous 2 times per day    apixaban  5 mg Oral BID     PRN Meds: trimethobenzamide, HYDROmorphone, sodium chloride flush, sodium chloride, ondansetron **OR** ondansetron, polyethylene glycol, glucose, dextrose, glucagon (rDNA), dextrose, sodium chloride flush, sodium chloride, oxyCODONE, morphine, aluminum & magnesium hydroxide-simethicone    Intake/Output Summary (Last 24 hours) at 3/1/2022 1550  Last data filed at 3/1/2022 0851  Gross per 24 hour   Intake 240 ml   Output 1250 ml   Net -1010 ml     Exam:  BP (!) 142/71   Pulse 73   Temp 98.4 °F (36.9 °C) (Oral)   Resp 16   Ht 5' 11\" (1.803 m)   Wt 200 lb (90.7 kg)   SpO2 98%   BMI 27.89 kg/m²   Head: Normocephalic, atraumatic  Sclera clear  Neck JVD flat  Lungs: normal effort of breathing    Labs:   Recent Labs     02/27/22  0735 02/28/22  1009   WBC 5.6 5.3   HGB 7.7* 7.6*   HCT 25.3* 24.4*   * 126*     Recent Labs     02/27/22  0735 02/28/22  1009    134*   K 4.0 4.3    101   CO2 27 26   BUN 7* 6*   CREATININE 0.47* 0.47*   CALCIUM 7.6* 7.7*     No results for input(s): INR in the last 72 hours. No results for input(s): Maritza Kylie in the last 72 hours. Radiology:  XR FEMUR RIGHT (MIN 2 VIEWS)   Final Result   NO FRACTURE.       XR TIBIA FIBULA RIGHT (2 VIEWS) Final Result   NEGATIVE RIGHT TIBIA AND FIBULA. US DUP LOWER EXTREMITY RIGHT ROXY   Final Result   NO SONOGRAPHIC EVIDENCE, DEEP VEIN THROMBOSIS RIGHT LOWER EXTREMITY, WITH ABOVE LIMITATIONS. FLUORO FOR SURGICAL PROCEDURES   Final Result   INTRAOPERATIVE FLUOROSCOPY AS DISCUSSED. XR CHEST PORTABLE   Final Result   NO ACUTE CARDIOPULMONARY DISEASE.      XR TIBIA FIBULA LEFT (2 VIEWS)   Final Result      Comminuted fractures of the proximal fibula and distal tibia as described. Assessment/Plan:  Fib/tib fx -being primarily managed by orthopedic surgery    Constipation: Lactulose started. Continue to monitor. Hx of afib: on eliquis, rate controlled. Pancreatic cancer with hospice. Diabetes: Monitor glucose accordion, titrate medications as needed    Disposition: Skilled nursing facility.     35 minutes total care time, >1/2 in unit/floor time and care coordination     Sandy Lee MD ,MD

## 2022-03-01 NOTE — PROGRESS NOTES
Physical Therapy Med Surg Daily Treatment Note  Facility/Department: [address] NEURO  Room: 29/N229-       NAME: Tarik Duarte  : 1960 (59 y.o.)  MRN: [id number]  CODE STATUS: Full Code    Date of Service: 3/1/2022    Patient Diagnosis(es): Closed fracture of left tibia and fibula, initial encounter [S82.202A, S82.402A]  Closed fracture of right tibia and fibula, initial encounter [S82.201A, S82.401A]  Closed fracture of right fibula and tibia, initial encounter Celsugar Esqueda, S82.401A]   Chief Complaint   Patient presents with    Fall     pt states lost his balance this morning and fell     Patient Active Problem List    Diagnosis Date Noted    Closed fracture of right fibula and tibia, initial encounter 2022    Closed fracture of right fibula and tibia 2022    Thrombocytopenia ([address]         Past Medical History:   Diagnosis Date    Atrial fibrillation ([address]     Diabetes mellitus ([facility] [address]      Past Surgical History:   Procedure Laterality Date    APPENDECTOMY      HERNIA REPAIR      TIBIA FRACTURE SURGERY Left 2022    LEFT TIBIA NAIL INSERTION performed by Jesus Zaragoza MD at [facility]       Restrictions  Restrictions/Precautions: Fall Risk;Weight Bearing  Lower Extremity Weight Bearing Restrictions  Partial Weight Bearing Percentage Or Pounds: touch down weight bearing    SUBJECTIVE   General  Chart Reviewed: Yes  Family / Caregiver Present: No  Subjective  Subjective: \"i don't know what i can do, i'm in such pain\"  General Comment  Comments: agreeable to tx. currently on bedpan    Pre-Session Pain Report     Pain Screening  Patient Currently in Pain: Yes       Post-Session Pain Report  Pain Assessment  Pain Assessment: 0-10  Pain Level: 7  Pain Type: Surgical pain  Pain Location: Leg  Pain Orientation: Left  Pain Descriptors: Aching  Pain Frequency: Continuous         OBJECTIVE   Orientation  Overall Orientation Status: Within Functional Limits    Bed mobility  Supine to Sit: Maximum assistance;2 Person assistance  Sit to Supine: Moderate assistance;2 Person assistance  Comment: pt with increased tolerance for position changes this date. still retropulsive but was able to get feet to the ground today. Transfers  Sit to Stand: Moderate Assistance;2 Person Assistance  Stand to sit: Moderate Assistance;2 Person Assistance  Comment: pt able to stand and he kept left leg off ground for safety versus toe touch wb.                                                         ASSESSMENT pt very fearful but was agreeable to participate. Pt responded well to having each step walked through with him. Pt not safe enough to get up to a chair at this time so he was returned to bed. Pt felt better that he was able to get up with less pain and screaming (according to him) and was hopeful he was getting better. Discharge Recommendations:  Continue to assess pending progress    Goals  Short term goals  Short term goal 1: Pt will demonstrate HEP with SBA  Long term goals  Long term goal 1: Pt will demonstrate bed mobility CGA  Long term goal 2: Pt will demonstrate transfers mod A with safest AD and demonstraing touch down weight bearing L % of the time  Long term goal 3: Pt will demonstrate amb max A with safest AD >/= 10ft  and demonstraing touch down weight bearing L % of the time  Long term goal 4: Pt will demonstrate w/c mobility indep >/=100ft with B UEs    PLAN    Times per week: 5-7  Times per day:  (1-2 visits per day)        [facility] (6 CLICK) BASIC MOBILITY  AM-PAC Inpatient Mobility Raw Score : 9     Therapy Time   Individual   Time In  0925   Time Out  0948   Minutes  23   23 minutes bed mobility/transfer training. Mariaelena Hernandez PTA, 03/01/22 at 10:21 AM         Definitions for assistance levels  Independent = pt does not require any physical supervision or assistance from another person for activity completion. Device may be needed.   Stand by assistance = pt requires verbal cues or instructions from another person, close to but not touching, to perform the activity  Minimal assistance= pt performs 75% or more of the activity; assistance is required to complete the activity  Moderate assistance= pt performs 50% of the activity; assistance is required to complete the activity  Maximal assistance = pt performs 25% of the activity; assistance is required to complete the activity  Dependent = pt requires total physical assistance to accomplish the task

## 2022-03-01 NOTE — CARE COORDINATION
CALL PLACED TO ALANNA IN ADMISSIONS AT [facility] TO GET AN  UPDATE ON STATUS OF REFERRAL. PER ALANNA, SHE WILL HAVE TO SPEAK WITH HER DON AND GET BACK TO US. CM TO FOLLOW.

## 2022-03-01 NOTE — PROGRESS NOTES
Physical Therapy Med Surg Daily Treatment Note  Facility/Department: [address] NEURO  Room: N229/N229-       NAME: Vivek Zacarias  : 1960 (59 y.o.)  MRN: [id number]  CODE STATUS: Full Code    Date of Service: 3/1/2022    Patient Diagnosis(es): Closed fracture of left tibia and fibula, initial encounter [S82.202A, S82.402A]  Closed fracture of right tibia and fibula, initial encounter [S82.201A, S82.401A]  Closed fracture of right fibula and tibia, initial encounter Julieta Jewel, S82.401A]   Chief Complaint   Patient presents with   Brunetta Nipper Fall     pt states lost his balance this morning and fell     Patient Active Problem List    Diagnosis Date Noted    Closed fracture of right fibula and tibia, initial encounter 2022    Closed fracture of right fibula and tibia 2022    Thrombocytopenia ([address]         Past Medical History:   Diagnosis Date    Atrial fibrillation ([address]     Diabetes mellitus ([address]      Past Surgical History:   Procedure Laterality Date    APPENDECTOMY      HERNIA REPAIR      TIBIA FRACTURE SURGERY Left 2022    LEFT TIBIA NAIL INSERTION performed by Brice Black MD at [facility]       Restrictions  Restrictions/Precautions: Fall Risk;Weight Bearing  Lower Extremity Weight Bearing Restrictions  Partial Weight Bearing Percentage Or Pounds: touch down weight bearing    SUBJECTIVE   General  Chart Reviewed: Yes  Family / Caregiver Present: No  Subjective  Subjective: pt sleeping soundly but did wake up to have PT  General Comment  Comments: agreeable to try bedside commode if able    Pre-Session Pain Report     Pain Screening  Patient Currently in Pain: Yes       Post-Session Pain Report  Pain Assessment  Pain Assessment: 0-10  Pain Level: 7  Pain Type: Surgical pain  Pain Location: Leg  Pain Orientation: Left  Pain Descriptors: Aching  Pain Frequency: Continuous         OBJECTIVE   Orientation  Overall Orientation Status: Within Functional Limits    Bed mobility  Supine to Sit: Moderate assistance;2 Person assistance  Sit to Supine: Moderate assistance;2 Person assistance  Comment: pt able to keep right leg bent and assist with left leg moving towards the edge of bed. physical assist to move leg out of bed. pt fearful of leg dropping off bed. 2nd person to provide support posteriorly. Transfers  Sit to Stand: Moderate Assistance;2 Person Assistance  Stand to sit: Moderate Assistance;2 Person Assistance  Comment: elevated bed for ease of pt to stand. pt with good management of wb restriction. pt follows nwb well versus toe touch. Pt able to transfer to bedside commode with +2 at this time. Pt was very fearful and when he went to sit down he started to yell \"i'm falling i'm falling i'm falling\". Pt was not falling and he was safe on commode. Pt reports starting to have bm. RN notified that pt up on commode. Encouraged her to have +2 for safety at this time. ASSESSMENT pt states he felt better that he can move with less pain. Was encouraged by being able to get to commode.          Discharge Recommendations:  Continue to assess pending progress    Goals  Short term goals  Short term goal 1: Pt will demonstrate HEP with SBA  Long term goals  Long term goal 1: Pt will demonstrate bed mobility CGA  Long term goal 2: Pt will demonstrate transfers mod A with safest AD and demonstraing touch down weight bearing L % of the time  Long term goal 3: Pt will demonstrate amb max A with safest AD >/= 10ft  and demonstraing touch down weight bearing L % of the time  Long term goal 4: Pt will demonstrate w/c mobility indep >/=100ft with B UEs    PLAN    Times per week: 5-7  Times per day:  (1-2 visits per day)        [facility] (6 CLICK) BASIC MOBILITY  AM-PAC Inpatient Mobility Raw Score : 9     Therapy Time   Individual   Time In  1320   Time Out 1343   Minutes 23   8 minutes transfer  15 minutes bed mobility          Vanessa Mercado PTA, 03/01/22 at 2:34 PM         Definitions for assistance levels  Independent = pt does not require any physical supervision or assistance from another person for activity completion. Device may be needed.   Stand by assistance = pt requires verbal cues or instructions from another person, close to but not touching, to perform the activity  Minimal assistance= pt performs 75% or more of the activity; assistance is required to complete the activity  Moderate assistance= pt performs 50% of the activity; assistance is required to complete the activity  Maximal assistance = pt performs 25% of the activity; assistance is required to complete the activity  Dependent = pt requires total physical assistance to accomplish the task

## 2022-03-02 LAB
GLUCOSE BLD-MCNC: 131 MG/DL (ref 70–99)
GLUCOSE BLD-MCNC: 152 MG/DL (ref 70–99)
GLUCOSE BLD-MCNC: 160 MG/DL (ref 70–99)
GLUCOSE BLD-MCNC: 162 MG/DL (ref 70–99)
PERFORMED ON: ABNORMAL

## 2022-03-02 PROCEDURE — 6370000000 HC RX 637 (ALT 250 FOR IP): Performed by: ORTHOPAEDIC SURGERY

## 2022-03-02 PROCEDURE — 6370000000 HC RX 637 (ALT 250 FOR IP): Performed by: INTERNAL MEDICINE

## 2022-03-02 PROCEDURE — 6360000002 HC RX W HCPCS: Performed by: FAMILY MEDICINE

## 2022-03-02 PROCEDURE — 97535 SELF CARE MNGMENT TRAINING: CPT

## 2022-03-02 PROCEDURE — 1210000000 HC MED SURG R&B

## 2022-03-02 PROCEDURE — 2580000003 HC RX 258: Performed by: ORTHOPAEDIC SURGERY

## 2022-03-02 PROCEDURE — 6370000000 HC RX 637 (ALT 250 FOR IP): Performed by: FAMILY MEDICINE

## 2022-03-02 RX ORDER — OXYCODONE HYDROCHLORIDE AND ACETAMINOPHEN 5; 325 MG/1; MG/1
1 TABLET ORAL EVERY 6 HOURS PRN
Qty: 28 TABLET | Refills: 0 | Status: SHIPPED | OUTPATIENT
Start: 2022-03-02 | End: 2022-03-09

## 2022-03-02 RX ORDER — OXYCODONE HYDROCHLORIDE AND ACETAMINOPHEN 5; 325 MG/1; MG/1
1 TABLET ORAL EVERY 6 HOURS PRN
Qty: 28 TABLET | Refills: 0 | Status: SHIPPED | OUTPATIENT
Start: 2022-03-02 | End: 2022-03-02 | Stop reason: SDUPTHER

## 2022-03-02 RX ADMIN — INSULIN GLARGINE 10 UNITS: 100 INJECTION, SOLUTION SUBCUTANEOUS at 21:21

## 2022-03-02 RX ADMIN — HYDROMORPHONE HYDROCHLORIDE 2 MG: 1 INJECTION, SOLUTION INTRAMUSCULAR; INTRAVENOUS; SUBCUTANEOUS at 06:37

## 2022-03-02 RX ADMIN — LACTULOSE 20 G: 20 SOLUTION ORAL at 12:34

## 2022-03-02 RX ADMIN — APIXABAN 5 MG: 5 TABLET, FILM COATED ORAL at 21:20

## 2022-03-02 RX ADMIN — HYDROMORPHONE HYDROCHLORIDE 2 MG: 1 INJECTION, SOLUTION INTRAMUSCULAR; INTRAVENOUS; SUBCUTANEOUS at 21:46

## 2022-03-02 RX ADMIN — HYDROMORPHONE HYDROCHLORIDE 2 MG: 1 INJECTION, SOLUTION INTRAMUSCULAR; INTRAVENOUS; SUBCUTANEOUS at 12:34

## 2022-03-02 RX ADMIN — Medication 10 ML: at 21:20

## 2022-03-02 RX ADMIN — Medication 10 ML: at 08:32

## 2022-03-02 RX ADMIN — LACTULOSE 20 G: 20 SOLUTION ORAL at 08:30

## 2022-03-02 RX ADMIN — APIXABAN 5 MG: 5 TABLET, FILM COATED ORAL at 08:30

## 2022-03-02 RX ADMIN — OXYCODONE HYDROCHLORIDE 5 MG: 5 TABLET ORAL at 18:35

## 2022-03-02 RX ADMIN — LACTULOSE 20 G: 20 SOLUTION ORAL at 21:20

## 2022-03-02 RX ADMIN — Medication 10 ML: at 08:30

## 2022-03-02 RX ADMIN — Medication 10 ML: at 21:46

## 2022-03-02 ASSESSMENT — PAIN DESCRIPTION - PAIN TYPE
TYPE: SURGICAL PAIN
TYPE: SURGICAL PAIN;ACUTE PAIN

## 2022-03-02 ASSESSMENT — ENCOUNTER SYMPTOMS
SHORTNESS OF BREATH: 0
SORE THROAT: 0
ABDOMINAL DISTENTION: 0
WHEEZING: 0
VOMITING: 0
CHEST TIGHTNESS: 0
PHOTOPHOBIA: 0
EYE DISCHARGE: 0
COUGH: 0
ABDOMINAL PAIN: 0

## 2022-03-02 ASSESSMENT — PAIN SCALES - GENERAL
PAINLEVEL_OUTOF10: 7
PAINLEVEL_OUTOF10: 6
PAINLEVEL_OUTOF10: 7
PAINLEVEL_OUTOF10: 3
PAINLEVEL_OUTOF10: 3
PAINLEVEL_OUTOF10: 5
PAINLEVEL_OUTOF10: 6
PAINLEVEL_OUTOF10: 5
PAINLEVEL_OUTOF10: 2
PAINLEVEL_OUTOF10: 7

## 2022-03-02 ASSESSMENT — PAIN DESCRIPTION - FREQUENCY
FREQUENCY: CONTINUOUS

## 2022-03-02 ASSESSMENT — PAIN DESCRIPTION - ORIENTATION
ORIENTATION: LEFT

## 2022-03-02 ASSESSMENT — PAIN DESCRIPTION - PROGRESSION
CLINICAL_PROGRESSION: NOT CHANGED

## 2022-03-02 ASSESSMENT — PAIN DESCRIPTION - DIRECTION
RADIATING_TOWARDS: LOWER
RADIATING_TOWARDS: LEFT
RADIATING_TOWARDS: LEFT

## 2022-03-02 ASSESSMENT — PAIN - FUNCTIONAL ASSESSMENT
PAIN_FUNCTIONAL_ASSESSMENT: PREVENTS OR INTERFERES SOME ACTIVE ACTIVITIES AND ADLS
PAIN_FUNCTIONAL_ASSESSMENT: ACTIVITIES ARE NOT PREVENTED
PAIN_FUNCTIONAL_ASSESSMENT: PREVENTS OR INTERFERES SOME ACTIVE ACTIVITIES AND ADLS

## 2022-03-02 ASSESSMENT — PAIN DESCRIPTION - LOCATION
LOCATION: LEG

## 2022-03-02 ASSESSMENT — PAIN DESCRIPTION - DESCRIPTORS
DESCRIPTORS: ACHING

## 2022-03-02 ASSESSMENT — PAIN DESCRIPTION - ONSET
ONSET: ON-GOING

## 2022-03-02 NOTE — CARE COORDINATION
LUCIAN spoke with Zenon Perry at [facility]. She has accepted the pt for admission as soon as she receives the [employer]. LUCIAN called [employer] and left a message to inform her that [facility] has accepted the pt. LUCIAN updated Shelly Trevino at [facility] as well.

## 2022-03-02 NOTE — FLOWSHEET NOTE
Pt assessment completed. VS obtained. No c/o at this time. Electronically signed by Henri Fontenot RN on 3/2/2022 at 10:03 AM

## 2022-03-02 NOTE — PROGRESS NOTES
Hospitalist Progress Note      Date of Admission: 2/26/2022  Chief Complaint:    Chief Complaint   Patient presents with    Fall     pt states lost his balance this morning and fell     Subjective:  No new complaints. No nausea, vomiting, chest pain, or headache      Medications:    Infusion Medications    sodium chloride      dextrose      sodium chloride       Scheduled Medications    lactulose  20 g Oral TID    sodium chloride flush  5-40 mL IntraVENous 2 times per day    insulin glargine  10 Units SubCUTAneous Nightly    insulin lispro  0-12 Units SubCUTAneous TID WC    insulin lispro  0-6 Units SubCUTAneous Nightly    sodium chloride flush  10 mL IntraVENous 2 times per day    apixaban  5 mg Oral BID     PRN Meds: trimethobenzamide, HYDROmorphone, sodium chloride flush, sodium chloride, ondansetron **OR** ondansetron, polyethylene glycol, glucose, dextrose, glucagon (rDNA), dextrose, sodium chloride flush, sodium chloride, oxyCODONE, morphine, aluminum & magnesium hydroxide-simethicone    Intake/Output Summary (Last 24 hours) at 3/2/2022 1437  Last data filed at 3/2/2022 6581  Gross per 24 hour   Intake 390 ml   Output 800 ml   Net -410 ml     Exam:  /80   Pulse 84   Temp 97.7 °F (36.5 °C) (Oral)   Resp 18   Ht 5' 11\" (1.803 m)   Wt 200 lb (90.7 kg)   SpO2 99%   BMI 27.89 kg/m²   Head: Normocephalic, atraumatic  Sclera clear  Neck JVD flat  Lungs: normal effort of breathing    Labs:   Recent Labs     02/28/22  1009   WBC 5.3   HGB 7.6*   HCT 24.4*   *     Recent Labs     02/28/22  1009   *   K 4.3      CO2 26   BUN 6*   CREATININE 0.47*   CALCIUM 7.7*     No results for input(s): INR in the last 72 hours. No results for input(s): Francesca Trinidad in the last 72 hours. Radiology:  XR FEMUR RIGHT (MIN 2 VIEWS)   Final Result   NO FRACTURE. XR TIBIA FIBULA RIGHT (2 VIEWS)   Final Result   NEGATIVE RIGHT TIBIA AND FIBULA.       US DUP LOWER EXTREMITY RIGHT ROXY Final Result   NO SONOGRAPHIC EVIDENCE, DEEP VEIN THROMBOSIS RIGHT LOWER EXTREMITY, WITH ABOVE LIMITATIONS. FLUORO FOR SURGICAL PROCEDURES   Final Result   INTRAOPERATIVE FLUOROSCOPY AS DISCUSSED. XR CHEST PORTABLE   Final Result   NO ACUTE CARDIOPULMONARY DISEASE.      XR TIBIA FIBULA LEFT (2 VIEWS)   Final Result      Comminuted fractures of the proximal fibula and distal tibia as described. Assessment/Plan:  Fib/tib fx -being primarily managed by orthopedic surgery    Constipation: resolved after lactulose    Hx of afib: on eliquis, rate controlled. Pancreatic cancer with hospice. Diabetes: Monitor glucose accordion, titrate medications as needed    Disposition: Skilled nursing facility.     35 minutes total care time, >1/2 in unit/floor time and care coordination     Sarah Mcnair MD ,MD

## 2022-03-02 NOTE — PROGRESS NOTES
Patient resting comfortably. Pain adequately controlled. He states that he was able to mobilize to the bedside chair and to the commode today with the aid of therapy. Left lower extremity dressing clean and dry. Distally neurovascular intact.       Status post left tibial nailing      Continue current orthopedic care  Continue with discharge planning

## 2022-03-02 NOTE — PROGRESS NOTES
Physical Therapy Missed Treatment   Facility/Department: [facility] MED SURG N229/N229-01    NAME: Fredo Burgess    : 1960 (59 y.o.)  MRN: [id number]    Account: [de-identified]  Gender: male    Chart reviewed, attempted PT at 5. Patient unavailable 2° to:        [x] Pt declined therapy this date d/t fatigue and pain. Patient reports nursing helped him get up to the chair and to the commode performing pivots and following WB status. \"I would but I really tired and in pain from getting up a little bit ago. \" Patient agreeable for tx tomorrow. [x] Nsg notified             Will attempt PT treatment again at earliest convenience.       Electronically signed by Arti Trejo PTA on 3/2/22 at 2:37 PM EST

## 2022-03-02 NOTE — PROGRESS NOTES
Physical Therapy Med Surg Daily Treatment Note  Facility/Department: [address] NEURO  Room: N229/N229-01       NAME: Saud Carbajal  : 1960 (59 y.o.)  MRN: [id number]  CODE STATUS: Full Code    Date of Service: 3/2/2022    Patient Diagnosis(es): Closed fracture of left tibia and fibula, initial encounter [S82.202A, S82.402A]  Closed fracture of right tibia and fibula, initial encounter [S82.201A, S82.401A]  Closed fracture of right fibula and tibia, initial encounter Bassam Ibrahim, S82.401A]   Chief Complaint   Patient presents with   Juli Beltrán     pt states lost his balance this morning and fell     Patient Active Problem List    Diagnosis Date Noted    Closed fracture of right fibula and tibia, initial encounter 2022    Closed fracture of right fibula and tibia 2022    Thrombocytopenia ([address]         Past Medical History:   Diagnosis Date    Atrial fibrillation ([address]     Diabetes mellitus ([address]      Past Surgical History:   Procedure Laterality Date    APPENDECTOMY      HERNIA REPAIR      TIBIA FRACTURE SURGERY Left 2022    LEFT TIBIA NAIL INSERTION performed by Federico Carpio MD at [facility]       Restrictions  Restrictions/Precautions: Fall Risk;Weight Bearing  Lower Extremity Weight Bearing Restrictions  Partial Weight Bearing Percentage Or Pounds: touch down weight bearing    SUBJECTIVE   General  Chart Reviewed: Yes  Missed reason: Patient ill  Response To Previous Treatment: Patient with no complaints from previous session. Subjective  Subjective: Patient resting in bed. Agreeable to tx.     Pre-Session Pain Report     Pain Screening  Patient Currently in Pain: Yes   Pain Assessment  Pain Assessment: 0-10  Pain Level: 3  Pain Type: Surgical pain;Acute pain  Pain Location: Leg  Pain Orientation: Left  Pain Descriptors: Aching  Pain Frequency: Continuous    Post-Session Pain Report  Pain Assessment  Pain Assessment: 0-10  Pain Level: 3  Pain Type: Surgical pain;Acute pain  Pain Location: Leg  Pain Orientation: Left  Pain Descriptors: Aching  Pain Frequency: Continuous    OBJECTIVE     Bed mobility  Supine to Sit: Moderate assistance  Scooting: Minimal assistance; Moderate assistance  Comment: Patient advances B LE towards EOB. Continues to require mod assist to lift trunk. Transfers  Sit to Stand: Moderate Assistance;2 Person Assistance;Minimal Assistance  Stand to sit: Moderate Assistance;Minimal Assistance;2 Person Assistance  Bed to Chair: Minimal assistance;2 Person Assistance  Comment: Patient completes sit to stand x2, SPT transfer bed ->BSC->chair with ww    Ambulation  Ambulation?: No (attempted to hop forward patient unable)    PT Education  PT Education: Goals;Plan of Care;PT Role;General Safety     ASSESSMENT   Body structures, Functions, Activity limitations: Decreased functional mobility ; Decreased safe awareness;Decreased balance;Decreased cognition;Decreased ROM; Decreased endurance; Increased pain;Decreased strength  Assessment: Patient improving tolerance to functional mobility training. Completes transfers at [place] [address] with good ability to maintain [place] restrictions. Attempted gait training however patient unable to hop.   Prognosis: Fair  REQUIRES PT FOLLOW UP: Yes     Discharge Recommendations:  Continue to assess pending progress    Goals  Short term goals  Short term goal 1: Pt will demonstrate HEP with SBA  Long term goals  Long term goal 1: Pt will demonstrate bed mobility CGA  Long term goal 2: Pt will demonstrate transfers mod A with safest AD and demonstraing touch down weight bearing L % of the time  Long term goal 3: Pt will demonstrate amb max A with safest AD >/= 10ft  and demonstraing touch down weight bearing L % of the time  Long term goal 4: Pt will demonstrate w/c mobility indep >/=100ft with B UEs    PLAN    Times per week: 5-7  Times per day:  (1-2 visits per day)  Safety Devices  Type of devices: Call light within reach,Chair alarm in place,Left in chair,Nurse notified     [facility] (6 CLICK) BASIC MOBILITY  AM-PAC Inpatient Mobility Raw Score : 11    Therapy Time   Individual   Time In 1035   Time Out 1053   Minutes 18     Timed Code Treatment Minutes: 18 Minutes   TR 10    Gt [address] [facility], 03/02/22 at 12:58 PM         Definitions for assistance levels  Independent = pt does not require any physical supervision or assistance from another person for activity completion. Device may be needed.   Stand by assistance = pt requires verbal cues or instructions from another person, close to but not touching, to perform the activity  Minimal assistance= pt performs 75% or more of the activity; assistance is required to complete the activity  Moderate assistance= pt performs 50% of the activity; assistance is required to complete the activity  Maximal assistance = pt performs 25% of the activity; assistance is required to complete the activity  Dependent = pt requires total physical assistance to accomplish the task

## 2022-03-03 VITALS
TEMPERATURE: 97.9 F | BODY MASS INDEX: 28 KG/M2 | HEIGHT: 71 IN | DIASTOLIC BLOOD PRESSURE: 74 MMHG | RESPIRATION RATE: 18 BRPM | HEART RATE: 91 BPM | SYSTOLIC BLOOD PRESSURE: 124 MMHG | OXYGEN SATURATION: 99 % | WEIGHT: 200 LBS

## 2022-03-03 LAB
GLUCOSE BLD-MCNC: 137 MG/DL (ref 70–99)
GLUCOSE BLD-MCNC: 152 MG/DL (ref 70–99)
GLUCOSE BLD-MCNC: 192 MG/DL (ref 70–99)
PERFORMED ON: ABNORMAL
SARS-COV-2, NAAT: NOT DETECTED

## 2022-03-03 PROCEDURE — 2580000003 HC RX 258: Performed by: ORTHOPAEDIC SURGERY

## 2022-03-03 PROCEDURE — 87635 SARS-COV-2 COVID-19 AMP PRB: CPT

## 2022-03-03 PROCEDURE — 6360000002 HC RX W HCPCS: Performed by: FAMILY MEDICINE

## 2022-03-03 PROCEDURE — 6370000000 HC RX 637 (ALT 250 FOR IP): Performed by: ORTHOPAEDIC SURGERY

## 2022-03-03 PROCEDURE — 97535 SELF CARE MNGMENT TRAINING: CPT

## 2022-03-03 RX ADMIN — HYDROMORPHONE HYDROCHLORIDE 2 MG: 1 INJECTION, SOLUTION INTRAMUSCULAR; INTRAVENOUS; SUBCUTANEOUS at 06:25

## 2022-03-03 RX ADMIN — Medication 10 ML: at 10:36

## 2022-03-03 RX ADMIN — OXYCODONE HYDROCHLORIDE 5 MG: 5 TABLET ORAL at 17:02

## 2022-03-03 RX ADMIN — HYDROMORPHONE HYDROCHLORIDE 2 MG: 1 INJECTION, SOLUTION INTRAMUSCULAR; INTRAVENOUS; SUBCUTANEOUS at 12:13

## 2022-03-03 RX ADMIN — OXYCODONE HYDROCHLORIDE 5 MG: 5 TABLET ORAL at 00:26

## 2022-03-03 RX ADMIN — OXYCODONE HYDROCHLORIDE 5 MG: 5 TABLET ORAL at 09:19

## 2022-03-03 RX ADMIN — APIXABAN 5 MG: 5 TABLET, FILM COATED ORAL at 09:19

## 2022-03-03 ASSESSMENT — PAIN - FUNCTIONAL ASSESSMENT
PAIN_FUNCTIONAL_ASSESSMENT: ACTIVITIES ARE NOT PREVENTED

## 2022-03-03 ASSESSMENT — PAIN SCALES - GENERAL
PAINLEVEL_OUTOF10: 9
PAINLEVEL_OUTOF10: 7
PAINLEVEL_OUTOF10: 9
PAINLEVEL_OUTOF10: 7
PAINLEVEL_OUTOF10: 8
PAINLEVEL_OUTOF10: 6
PAINLEVEL_OUTOF10: 5
PAINLEVEL_OUTOF10: 7
PAINLEVEL_OUTOF10: 8

## 2022-03-03 ASSESSMENT — PAIN DESCRIPTION - DIRECTION
RADIATING_TOWARDS: LOWER
RADIATING_TOWARDS: LOWER

## 2022-03-03 ASSESSMENT — PAIN DESCRIPTION - PROGRESSION
CLINICAL_PROGRESSION: NOT CHANGED

## 2022-03-03 ASSESSMENT — PAIN DESCRIPTION - DESCRIPTORS
DESCRIPTORS: ACHING

## 2022-03-03 ASSESSMENT — PAIN DESCRIPTION - ORIENTATION
ORIENTATION: LEFT

## 2022-03-03 ASSESSMENT — PAIN DESCRIPTION - FREQUENCY
FREQUENCY: CONTINUOUS

## 2022-03-03 ASSESSMENT — PAIN DESCRIPTION - LOCATION
LOCATION: LEG

## 2022-03-03 ASSESSMENT — PAIN DESCRIPTION - ONSET
ONSET: ON-GOING

## 2022-03-03 ASSESSMENT — PAIN DESCRIPTION - PAIN TYPE
TYPE: SURGICAL PAIN

## 2022-03-03 NOTE — PROGRESS NOTES
Hospitalist Progress Note      Date of Admission: 2/26/2022  Chief Complaint:    Chief Complaint   Patient presents with    Fall     pt states lost his balance this morning and fell     Subjective:  No new complaints. No nausea, vomiting, chest pain, or headache      Medications:    Infusion Medications    sodium chloride      dextrose      sodium chloride       Scheduled Medications    sodium chloride flush  5-40 mL IntraVENous 2 times per day    insulin glargine  10 Units SubCUTAneous Nightly    insulin lispro  0-12 Units SubCUTAneous TID WC    insulin lispro  0-6 Units SubCUTAneous Nightly    sodium chloride flush  10 mL IntraVENous 2 times per day    apixaban  5 mg Oral BID     PRN Meds: dextrose bolus (hypoglycemia) **OR** dextrose bolus (hypoglycemia), trimethobenzamide, HYDROmorphone, sodium chloride flush, sodium chloride, ondansetron **OR** ondansetron, polyethylene glycol, glucose, glucagon (rDNA), dextrose, sodium chloride flush, sodium chloride, oxyCODONE, morphine, aluminum & magnesium hydroxide-simethicone    Intake/Output Summary (Last 24 hours) at 3/3/2022 1736  Last data filed at 3/3/2022 0105  Gross per 24 hour   Intake 250 ml   Output 280 ml   Net -30 ml     Exam:  /67   Pulse 76   Temp 97.2 °F (36.2 °C) (Oral)   Resp 16   Ht 5' 11\" (1.803 m)   Wt 200 lb (90.7 kg)   SpO2 98%   BMI 27.89 kg/m²   Head: Normocephalic, atraumatic  Sclera clear  Neck JVD flat  Lungs: normal effort of breathing    Labs:   No results for input(s): WBC, HGB, HCT, PLT in the last 72 hours. No results for input(s): NA, K, CL, CO2, BUN, CREATININE, CALCIUM, PHOS, AST, ALT, BILIDIR, BILITOT, ALKPHOS in the last 72 hours. Invalid input(s): MAGNES  No results for input(s): INR in the last 72 hours. No results for input(s): Gurmeet Elli in the last 72 hours. Radiology:  XR FEMUR RIGHT (MIN 2 VIEWS)   Final Result   NO FRACTURE.       XR TIBIA FIBULA RIGHT (2 VIEWS)   Final Result   NEGATIVE RIGHT TIBIA AND FIBULA. US DUP LOWER EXTREMITY RIGHT ROXY   Final Result   NO SONOGRAPHIC EVIDENCE, DEEP VEIN THROMBOSIS RIGHT LOWER EXTREMITY, WITH ABOVE LIMITATIONS. FLUORO FOR SURGICAL PROCEDURES   Final Result   INTRAOPERATIVE FLUOROSCOPY AS DISCUSSED. XR CHEST PORTABLE   Final Result   NO ACUTE CARDIOPULMONARY DISEASE.      XR TIBIA FIBULA LEFT (2 VIEWS)   Final Result      Comminuted fractures of the proximal fibula and distal tibia as described. Assessment/Plan:  Fib/tib fx -being primarily managed by orthopedic surgery    Constipation: resolved after lactulose    Hx of afib: on eliquis, rate controlled. Pancreatic cancer with hospice. Diabetes: Monitor glucose accordion, titrate medications as needed    Disposition: Skilled nursing facility.     25 minutes total care time, >1/2 in unit/floor time and care coordination     Juani Jasso MD

## 2022-03-03 NOTE — PROGRESS NOTES
Department of Orthopedic Surgery  Attending Progress Note      SUBJECTIVE the patient is resting comfortably, his left leg pain is under control. OBJECTIVE    Physical    The splint is intact on the left lower extremity. He is able to wiggle his toes on the left. He has intact sensation on the dorsum of the left foot.       Data    Not applicable  Current Inpatient Medications    Current Facility-Administered Medications: dextrose bolus (hypoglycemia) 10% 125 mL, 125 mL, IntraVENous, PRN **OR** dextrose bolus (hypoglycemia) 10% 250 mL, 250 mL, IntraVENous, PRN  trimethobenzamide (TIGAN) injection 200 mg, 200 mg, IntraMUSCular, Q6H PRN  HYDROmorphone (DILAUDID) injection 2 mg, 2 mg, IntraVENous, Q3H PRN  sodium chloride flush 0.9 % injection 5-40 mL, 5-40 mL, IntraVENous, 2 times per day  sodium chloride flush 0.9 % injection 5-40 mL, 5-40 mL, IntraVENous, PRN  0.9 % sodium chloride infusion, 25 mL, IntraVENous, PRN  ondansetron (ZOFRAN-ODT) disintegrating tablet 4 mg, 4 mg, Oral, Q8H PRN **OR** ondansetron (ZOFRAN) injection 4 mg, 4 mg, IntraVENous, Q6H PRN  polyethylene glycol (GLYCOLAX) packet 17 g, 17 g, Oral, Daily PRN  glucose (GLUTOSE) 40 % oral gel 15 g, 15 g, Oral, PRN  glucagon (rDNA) injection 1 mg, 1 mg, IntraMUSCular, PRN  dextrose 5 % solution, 100 mL/hr, IntraVENous, PRN  insulin glargine (LANTUS) injection vial 10 Units, 10 Units, SubCUTAneous, Nightly  insulin lispro (HUMALOG) injection vial 0-12 Units, 0-12 Units, SubCUTAneous, TID WC  insulin lispro (HUMALOG) injection vial 0-6 Units, 0-6 Units, SubCUTAneous, Nightly  sodium chloride flush 0.9 % injection 10 mL, 10 mL, IntraVENous, 2 times per day  sodium chloride flush 0.9 % injection 10 mL, 10 mL, IntraVENous, PRN  0.9 % sodium chloride infusion, 25 mL, IntraVENous, PRN  oxyCODONE (ROXICODONE) immediate release tablet 5 mg, 5 mg, Oral, Q4H PRN  morphine (PF) injection 2 mg, 2 mg, IntraVENous, Q3H PRN  aluminum & magnesium hydroxide-simethicone (MAALOX) 886-752-63 MG/5ML suspension 15 mL, 15 mL, Oral, Q6H PRN  apixaban (ELIQUIS) tablet 5 mg, 5 mg, Oral, BID    ASSESSMENT AND PLAN      Postop day #5 left tibial nail    The patient is awaiting placement. Continue to mobilize protected weightbearing on the left lower extremity. Continue current pain medication regimen.

## 2022-03-03 NOTE — CARE COORDINATION
LUCIAN spoke with Richelle Cowan with [facility] and she confirmed that all paperwork has been received and we have an approval for the pt to go to [facility], however there is still a process that Radha Zavala needs to go through to be able to bring the pt in. LUCIAN spoke with Bethel Braun at [facility] and she is aware of all of this and will work on the paperwork she needs to do to bring the pt in. GUILLERMOW to await approval to move pt today. [id number] completed.

## 2022-03-03 NOTE — CARE COORDINATION
03/03/22    From: Home with [place] patient. Admit: s/p fall with LLE tib/fib fractures and surgery. PMH: Stage 4 pancreatic cancer (not undergoing treatment), DM2, afib (on Eliquis), BPH. Anticipated Discharge Disposition: To [facility] with [address] to cover R/B while at [facility] with hospice. Pending final approval from [facility] per  note this morning. Patient Mobility or PT/OT ordered: Yes - Patient declines treatment. Consults: Hospitalist    Covid result &/or vacc status: 2/26/22 ~ Negative. Vaccinated x2 doses. Barriers to Discharge:  - Pending final approval from [facility] [address] has all aspects of patient's DC plan arranged pending this approval.    Assessments: CMI done.

## 2022-03-03 NOTE — FLOWSHEET NOTE
Assessment completed. VS obtained. No c/o at this time. .Electronically signed by Ross Abdi RN on 3/3/2022 at 10:46 AM

## 2022-03-03 NOTE — PROGRESS NOTES
Physical Therapy Missed Treatment   Facility/Department: [facility] MED SURG N229/N229-01    NAME: Bruce Forman  Patient Status:   : 1960 (59 y.o.)  MRN: [id number]  Account: [de-identified]  Gender: male        [x] Patient Declines PT Treatment            [] Patient Unavailable:     Pt declined session due to increased pain and just got comfortable. Requested PT come back in a few hours. Will attempt PT Treatment again at earliest convenience.         Electronically signed by Vanessa Mercado PTA on 3/3/22 at 9:31 AM EST

## 2022-03-03 NOTE — PROGRESS NOTES
[facility] Occupational Therapy      Date: 3/3/2022  Patient Name: Maryuri German        MRN: [id number]  Account: [de-identified]   : 1960  (59 y.o.)  Room: [place] [address]    Chart reviewed, attempted OT at [facility] [id number]. Patient not seen 2° to:    Pt declined therapy at this time reporting significant pain in his leg. Pt requests therapist notify RN and request pain medication. Notified RN    Will attempt again when able.     Electronically signed by CHARY Narvaez on 3/3/2022 at 9:16 AM

## 2022-03-03 NOTE — PROGRESS NOTES
Physical Therapy Med Surg Daily Treatment Note  Facility/Department: [address] NEURO  Room: 29/N229-       NAME: Bautista Deluca  : 1960 (59 y.o.)  MRN: [id number]  CODE STATUS: Full Code    Date of Service: 3/3/2022    Patient Diagnosis(es): Closed fracture of left tibia and fibula, initial encounter [S82.202A, S82.402A]  Closed fracture of right tibia and fibula, initial encounter [S82.201A, S82.401A]  Closed fracture of right fibula and tibia, initial encounter [S82.201A, S82.401A]   Chief Complaint   Patient presents with   Denny Kaur     pt states lost his balance this morning and fell     Patient Active Problem List    Diagnosis Date Noted    Closed fracture of right fibula and tibia, initial encounter 2022    Closed fracture of right fibula and tibia 2022    Thrombocytopenia ([address]         Past Medical History:   Diagnosis Date    Atrial fibrillation ([address]     Diabetes mellitus ([address]      Past Surgical History:   Procedure Laterality Date    APPENDECTOMY      HERNIA REPAIR      TIBIA FRACTURE SURGERY Left 2022    LEFT TIBIA NAIL INSERTION performed by Laruth Schwab, MD at [facility]       Restrictions  Restrictions/Precautions: Fall Risk;Weight Bearing  Lower Extremity Weight Bearing Restrictions  Partial Weight Bearing Percentage Or Pounds: touch down weight bearing    SUBJECTIVE   General  Chart Reviewed: Yes  Family / Caregiver Present: No  Subjective  Subjective: Patient resting in bed. Agreeable to tx.   General Comment  Comments: agreeable to try bedside commode if able    Pre-Session Pain Report  Pre Treatment Pain Screening  Pain at present: 8  Scale Used: Numeric Score  Intervention List: Patient able to continue with treatment  Pain Screening  Patient Currently in Pain: Yes       Post-Session Pain Report  Pain Assessment  Pain Assessment: 0-10  Pain Level: 8  Pain Type: Surgical pain  Pain Location: Leg  Pain Orientation: Left         OBJECTIVE        Bed mobility  Supine to Sit: Minimal assistance  Sit to Supine: Minimal assistance  Comment: pt needing some assistance to advance LLE into and out of bed. Transfers  Sit to Stand: Moderate Assistance;2 Person Assistance  Stand to sit: Moderate Assistance;2 Person Assistance  Bed to Chair: Minimal assistance;2 Person Assistance  Comment: Patient completes sit to stand x2, SPT transfer bed ->BSC->bed with ww. vc's for hand placement and sequencing for improved safety during transfers. Ambulation  Ambulation?: No (attempted to hop forward patient unable)          Activity Tolerance  Activity Tolerance: Patient limited by pain  Activity Tolerance: Pt limited by new weight bearing status L LE and strength and balance deficits          ASSESSMENT   Assessment: increased time and effort for all tasks, pt still unable to hop. fair follow through of cues.      Discharge Recommendations:  Continue to assess pending progress    Goals  Short term goals  Short term goal 1: Pt will demonstrate HEP with SBA  Long term goals  Long term goal 1: Pt will demonstrate bed mobility CGA  Long term goal 2: Pt will demonstrate transfers mod A with safest AD and demonstraing touch down weight bearing L % of the time  Long term goal 3: Pt will demonstrate amb max A with safest AD >/= 10ft  and demonstraing touch down weight bearing L % of the time  Long term goal 4: Pt will demonstrate w/c mobility indep >/=100ft with B UEs    PLAN    Times per week: 5-7  Times per day:  (1-2 visits per day)  Safety Devices  Type of devices: Bed alarm in place,Call light within reach,Left in bed     [facility] (6 CLICK) BASIC MOBILITY  AM-PAC Inpatient Mobility Raw Score : 12      Therapy Time   Individual   Time In 1145   Time Out 1208   Minutes 23      BM/Trsf: [address] PTA, 03/03/22 at 1:12 PM         Definitions for assistance levels  Independent = pt does not require any physical supervision or assistance from another person for activity completion. Device may be needed.   Stand by assistance = pt requires verbal cues or instructions from another person, close to but not touching, to perform the activity  Minimal assistance= pt performs 75% or more of the activity; assistance is required to complete the activity  Moderate assistance= pt performs 50% of the activity; assistance is required to complete the activity  Maximal assistance = pt performs 25% of the activity; assistance is required to complete the activity  Dependent = pt requires total physical assistance to accomplish the task

## 2022-03-04 NOTE — FLOWSHEET NOTE
DAV SINGLETON RN on previous shift called report to [address] [place] [address], patient for 6PM pickup per [facility], will call to verify pick-up.

## 2022-03-04 NOTE — PROGRESS NOTES
Physical Therapy  Facility/Department: [facility] MED SURG N229/N229-01  Physical Therapy Discharge      NAME: Sarah He    : 1960 (59 y.o.)  MRN: [id number]    Account: [de-identified]  Gender: male      Patient has been discharged from acute care hospital. DC patient from current PT program.      Electronically signed by Ana Bernal PT on 3/4/22 at 2:27 PM EST

## 2022-03-04 NOTE — DISCHARGE SUMMARY
Discharge summary  This patient Talia Nickerson was admitted to the hospital on 2/26/2022  after undergoing Procedure(s) (LRB):  LEFT TIBIA NAIL INSERTION (Left) without complications that morning. During the postoperative period,while in hospital, patient was medically managed by the hospitalist. Please see medial notes and H&P for patients additional diagnoses. Ortho agrees with all medical diagnoses and treatments while patient in hospital.  No significant or unexpected findings or abnormal ortho imaging were noted during the hospital stay    Hospital course      Patient tolerated surgical procedure well and there was no complications. Patient progressed adequately through their recovery during hospital stay including PT and rehabilitation. DVT prophylaxis started on pod1  Patient was then D/C on 3/3/2022 to Skilled nursing facility  in stable condition. Patient was instructed on the use of pain medications, the signs and symptoms of infection, and was given our number to call should they have any questions or concerns following discharge. Based on my clinical judgment, the patient was provided with a 7-day prescription for opioid medication at [facility] MED, indicated for treatment of acute pain in the setting of recent s/p left tibia nail insertion for left tibia fracture. OARRS report was run and has demonstrated an appropriate time course. The patient has been provided with counseling pertaining to safe use of opioid medication.       Patient may WBAT to operative extremity with use of walker for assistance with ambulation   Aquacel dressing to be removed pod7 and incision left open to air  Eliquis for dvt prophylaxis  Patient to follow up with orthopedic surgeon in two weeks      Secondary diagnoses:   Acute post-operative pain  Acute blood loss anemia secondary to expected surgical blood loss  Pancreatic cancer on hospice care

## 2022-04-07 NOTE — PROGRESS NOTES
DAILY PROGRESS NOTE      POD: 1 left tibia nailing    Patient doing well. Patient is complaining of moderate intensity right thigh pain. Left tibia is improved  Vitals:    22 0741   BP: (!) 142/72   Pulse: 98   Resp: 16   Temp: 99 °F (37.2 °C)   SpO2: 95%      Temp (24hrs), Av.3 °F (36.8 °C), Min:96.9 °F (36.1 °C), Max:99 °F (37.2 °C)       Pain Control Good  No chest pain or shortness of breath. No calf pain    Exam:   Incision(s): no drainage  Dressing clean, dry, and intact  Operative extremity shows neuro vascular status intact. Flexion and extension intact on operative extremity  Calves soft and non-tender without evidence of DVT. .      Labs reviewed:  CBC:   Recent Labs     22  0615 22  0735   WBC 5.3 5.6   HGB 8.7* 7.7*   * 112*     BMP:    Recent Labs     22  0615 22  0735   * 135   K 4.0 4.0   CL 97 100   CO2 24 27   BUN 7* 7*   CREATININE 0.52* 0.47*   GLUCOSE 130* 176*       I&O  I/O last 3 completed shifts: In: 1964.6 [I.V.:1864.6; IV Piggyback:100]  Out: 300 [Urine:300]      Assessment:  Good Post Operative Course. Plan: Left tibia nailing  1. Touchdown weightbearing left lower extremity. 2.  We will obtain stat x-rays of his right femur and right tibia given his significant increase in the pain. 3.  PT OT eval and treat  4.   Okay to resume Miguelina Raygoza MD MD  2022 10:59 AM Health Maintenance Due   Topic Date Due   • Shingles Vaccine (1 of 2) Never done   • COVID-19 Vaccine (3 - Booster for Pfizer series) 02/03/2022       Patient is due for topics as listed above but is not proceeding with Immunization(s) COVID-19 and Shingles at this time.

## (undated) DEVICE — STAPLER SKIN H3.9MM WIRE DIA0.58MM CRWN 6.9MM 35 STPL FIX

## (undated) DEVICE — SPLINT PLASTER SPECIALIST 5X45

## (undated) DEVICE — PADDING UNDERCAST W6INXL4YD RAYON POLY SYN NONADHESIVE

## (undated) DEVICE — GOWN,AURORA,NONREINFORCED,LARGE: Brand: MEDLINE

## (undated) DEVICE — 3M™ STERI-DRAPE™ U-DRAPE 1015: Brand: STERI-DRAPE™

## (undated) DEVICE — C-ARM: Brand: UNBRANDED

## (undated) DEVICE — MARKER SURG SKIN GENTIAN VLT REG TIP W/ 6IN RUL

## (undated) DEVICE — Z DISCONTINUED USE 2272117 DRAPE SURG 3 QTR N INVASIVE 2 LAYR DISP

## (undated) DEVICE — SYRINGE IRRIG 60ML SFT PLIABLE BLB EZ TO GRP 1 HND USE W/

## (undated) DEVICE — PAD,ABDOMINAL,8"X10",ST,LF: Brand: MEDLINE

## (undated) DEVICE — LABEL MED MINI W/ MARKER

## (undated) DEVICE — SPONGE,LAP,18"X18",DLX,XR,ST,5/PK,40/PK: Brand: MEDLINE

## (undated) DEVICE — GLOVE ORANGE PI 7   MSG9070

## (undated) DEVICE — BIT DRL L145MM DIA4.2MM NONSTERILE 3 FLUT NDL PNT QUIK CPL

## (undated) DEVICE — COVER LT HNDL BLU PLAS

## (undated) DEVICE — COUNTER NDL 40 COUNT HLD 70 FOAM BLK ADH W/ MAG

## (undated) DEVICE — APPLICATOR MEDICATED 26 CC SOLUTION HI LT ORNG CHLORAPREP

## (undated) DEVICE — KIT ARMOR C DRP COLLAPSIBLE AND SELF EXP TOP CVR FOR FLUOROSCOPIC

## (undated) DEVICE — BIT DRL L330MM DIA4.2MM CALIB 100MM 3 FLUT QUIK CPL

## (undated) DEVICE — SINGLE PORT MANIFOLD: Brand: NEPTUNE 2

## (undated) DEVICE — INTENDED FOR TISSUE SEPARATION, AND OTHER PROCEDURES THAT REQUIRE A SHARP SURGICAL BLADE TO PUNCTURE OR CUT.: Brand: BARD-PARKER ® CARBON RIB-BACK BLADES

## (undated) DEVICE — ELECTRODE PT RET AD L9FT HI MOIST COND ADH HYDRGEL CORDED

## (undated) DEVICE — DRESSING GZ W1XL8IN COT XRFRM N ADH OVERWRAP CURAD

## (undated) DEVICE — TOWEL,OR,DSP,ST,BLUE,STD,4/PK,20PK/CS: Brand: MEDLINE

## (undated) DEVICE — ELECTRODE ES L275IN 275IN BLDE TIP COAT PTFE TEF W EVAC

## (undated) DEVICE — Device

## (undated) DEVICE — PACK ARTHRO III ST SIRUS

## (undated) DEVICE — GLOVE ORTHO 8   MSG9480

## (undated) DEVICE — SUTURE VCRL SZ 2-0 L36IN ABSRB UD L36MM CT-1 1/2 CIR J945H

## (undated) DEVICE — YANKAUER,SMOOTH HANDLE,HIGH CAPACITY: Brand: MEDLINE INDUSTRIES, INC.

## (undated) DEVICE — SHEET, DRAPE, SPLIT, STERILE: Brand: MEDLINE

## (undated) DEVICE — BANDAGE COBAN 4 IN COMPR W4INXL5YD FOAM COHESIVE QUIK STK SELF ADH SFT

## (undated) DEVICE — GOWN,SIRUS,POLYRNF,BRTHSLV,XLN/XL,20/CS: Brand: MEDLINE

## (undated) DEVICE — GUIDEWIRE ORTH L400MM DIA3.2MM FOR TFN

## (undated) DEVICE — SPONGE GZ W4XL4IN COT 12 PLY TYP VII WVN C FLD DSGN

## (undated) DEVICE — BANDAGE COMPR W6INXL5YD WHT BGE POLY COT M E WRP WV HK AND

## (undated) DEVICE — TUBING, SUCTION, 1/4" X 10', STRAIGHT: Brand: MEDLINE

## (undated) DEVICE — 3M™ IOBAN™ 2 ANTIMICROBIAL INCISE DRAPE 6650EZ: Brand: IOBAN™ 2

## (undated) DEVICE — BANDAGE COMPR W4INXL5YD WHT BGE POLY COT M E WRP WV HK AND

## (undated) DEVICE — NEPTUNE E-SEP SMOKE EVACUATION PENCIL, COATED, 70MM BLADE, PUSH BUTTON SWITCH: Brand: NEPTUNE E-SEP

## (undated) DEVICE — ROD RMR L950MM DIA2.5MM W/ EXTN BALL TIP

## (undated) DEVICE — PADDING CAST W6INXL4YD RAYON UNDERCAST SOF-ROL

## (undated) DEVICE — GLOVE ORANGE PI 7 1/2   MSG9075

## (undated) DEVICE — ALCOHOL RUBBING ISO 16OZ 70%

## (undated) DEVICE — DRAPE EQUIP TRNSPRT CONTAINMENT FOR BK TAB